# Patient Record
Sex: MALE | Race: WHITE | NOT HISPANIC OR LATINO | Employment: OTHER | URBAN - NONMETROPOLITAN AREA
[De-identification: names, ages, dates, MRNs, and addresses within clinical notes are randomized per-mention and may not be internally consistent; named-entity substitution may affect disease eponyms.]

---

## 2021-02-12 ENCOUNTER — HOSPITAL ENCOUNTER (OUTPATIENT)
Dept: LAB | Facility: MEDICAL CENTER | Age: 46
End: 2021-02-12
Attending: FAMILY MEDICINE
Payer: MEDICAID

## 2021-02-12 LAB
ALBUMIN SERPL BCP-MCNC: 4.4 G/DL (ref 3.2–4.9)
ALBUMIN/GLOB SERPL: 1.5 G/DL
ALP SERPL-CCNC: 57 U/L (ref 30–99)
ALT SERPL-CCNC: 13 U/L (ref 2–50)
ANION GAP SERPL CALC-SCNC: 9 MMOL/L (ref 7–16)
AST SERPL-CCNC: 16 U/L (ref 12–45)
BASOPHILS # BLD AUTO: 0.8 % (ref 0–1.8)
BASOPHILS # BLD: 0.05 K/UL (ref 0–0.12)
BILIRUB SERPL-MCNC: 0.5 MG/DL (ref 0.1–1.5)
BUN SERPL-MCNC: 14 MG/DL (ref 8–22)
CALCIUM SERPL-MCNC: 9.3 MG/DL (ref 8.5–10.5)
CHLORIDE SERPL-SCNC: 104 MMOL/L (ref 96–112)
CO2 SERPL-SCNC: 27 MMOL/L (ref 20–33)
CREAT SERPL-MCNC: 0.79 MG/DL (ref 0.5–1.4)
EOSINOPHIL # BLD AUTO: 0.22 K/UL (ref 0–0.51)
EOSINOPHIL NFR BLD: 3.7 % (ref 0–6.9)
ERYTHROCYTE [DISTWIDTH] IN BLOOD BY AUTOMATED COUNT: 42.7 FL (ref 35.9–50)
ERYTHROCYTE [SEDIMENTATION RATE] IN BLOOD BY WESTERGREN METHOD: 2 MM/HOUR (ref 0–15)
FASTING STATUS PATIENT QL REPORTED: NORMAL
GLOBULIN SER CALC-MCNC: 3 G/DL (ref 1.9–3.5)
GLUCOSE SERPL-MCNC: 97 MG/DL (ref 65–99)
HCT VFR BLD AUTO: 45.4 % (ref 42–52)
HGB BLD-MCNC: 15.2 G/DL (ref 14–18)
IMM GRANULOCYTES # BLD AUTO: 0.02 K/UL (ref 0–0.11)
IMM GRANULOCYTES NFR BLD AUTO: 0.3 % (ref 0–0.9)
LYMPHOCYTES # BLD AUTO: 2.65 K/UL (ref 1–4.8)
LYMPHOCYTES NFR BLD: 44.8 % (ref 22–41)
MCH RBC QN AUTO: 32.4 PG (ref 27–33)
MCHC RBC AUTO-ENTMCNC: 33.5 G/DL (ref 33.7–35.3)
MCV RBC AUTO: 96.8 FL (ref 81.4–97.8)
MONOCYTES # BLD AUTO: 0.42 K/UL (ref 0–0.85)
MONOCYTES NFR BLD AUTO: 7.1 % (ref 0–13.4)
NEUTROPHILS # BLD AUTO: 2.55 K/UL (ref 1.82–7.42)
NEUTROPHILS NFR BLD: 43.3 % (ref 44–72)
NRBC # BLD AUTO: 0 K/UL
NRBC BLD-RTO: 0 /100 WBC
PLATELET # BLD AUTO: 233 K/UL (ref 164–446)
PMV BLD AUTO: 9.7 FL (ref 9–12.9)
POTASSIUM SERPL-SCNC: 4.6 MMOL/L (ref 3.6–5.5)
PROT SERPL-MCNC: 7.4 G/DL (ref 6–8.2)
RBC # BLD AUTO: 4.69 M/UL (ref 4.7–6.1)
SODIUM SERPL-SCNC: 140 MMOL/L (ref 135–145)
TSH SERPL DL<=0.005 MIU/L-ACNC: 1.39 UIU/ML (ref 0.38–5.33)
WBC # BLD AUTO: 5.9 K/UL (ref 4.8–10.8)

## 2021-02-12 PROCEDURE — 85652 RBC SED RATE AUTOMATED: CPT

## 2021-02-12 PROCEDURE — 80053 COMPREHEN METABOLIC PANEL: CPT

## 2021-02-12 PROCEDURE — 85025 COMPLETE CBC W/AUTO DIFF WBC: CPT

## 2021-02-12 PROCEDURE — 36415 COLL VENOUS BLD VENIPUNCTURE: CPT

## 2021-02-12 PROCEDURE — 84443 ASSAY THYROID STIM HORMONE: CPT

## 2021-02-12 PROCEDURE — 86038 ANTINUCLEAR ANTIBODIES: CPT

## 2021-02-15 LAB — NUCLEAR IGG SER QL IA: NORMAL

## 2021-05-25 ENCOUNTER — TELEPHONE (OUTPATIENT)
Dept: SCHEDULING | Facility: IMAGING CENTER | Age: 46
End: 2021-05-25

## 2021-05-27 ENCOUNTER — OFFICE VISIT (OUTPATIENT)
Dept: MEDICAL GROUP | Facility: CLINIC | Age: 46
End: 2021-05-27
Payer: MEDICAID

## 2021-05-27 VITALS
RESPIRATION RATE: 16 BRPM | OXYGEN SATURATION: 97 % | WEIGHT: 182.8 LBS | TEMPERATURE: 98.5 F | DIASTOLIC BLOOD PRESSURE: 78 MMHG | HEART RATE: 78 BPM | HEIGHT: 70 IN | BODY MASS INDEX: 26.17 KG/M2 | SYSTOLIC BLOOD PRESSURE: 124 MMHG

## 2021-05-27 DIAGNOSIS — R53.82 CHRONIC FATIGUE: ICD-10-CM

## 2021-05-27 DIAGNOSIS — R09.81 NASAL CONGESTION: ICD-10-CM

## 2021-05-27 DIAGNOSIS — I25.2 PERSONAL HISTORY OF MI (MYOCARDIAL INFARCTION): ICD-10-CM

## 2021-05-27 DIAGNOSIS — J34.3 HYPERTROPHY OF NASAL TURBINATES: ICD-10-CM

## 2021-05-27 DIAGNOSIS — A98.5: ICD-10-CM

## 2021-05-27 PROBLEM — M54.31 RIGHT SIDED SCIATICA: Status: ACTIVE | Noted: 2019-04-16

## 2021-05-27 PROBLEM — Z20.2 HPV EXPOSURE: Status: ACTIVE | Noted: 2018-12-13

## 2021-05-27 PROCEDURE — 99204 OFFICE O/P NEW MOD 45 MIN: CPT | Performed by: PHYSICIAN ASSISTANT

## 2021-05-27 RX ORDER — ACYCLOVIR 200 MG/1
300 CAPSULE ORAL PRN
COMMUNITY
End: 2022-11-04

## 2021-05-27 RX ORDER — FLUTICASONE PROPIONATE 50 MCG
1 SPRAY, SUSPENSION (ML) NASAL DAILY
COMMUNITY
End: 2024-03-15

## 2021-05-27 RX ORDER — SODIUM CHLORIDE 9 MG/ML
INJECTION, SOLUTION INTRAVENOUS
COMMUNITY
Start: 2021-05-12 | End: 2021-05-27

## 2021-05-27 RX ORDER — OXYMETAZOLINE HYDROCHLORIDE 0.05 G/100ML
2 SPRAY NASAL
COMMUNITY
End: 2021-05-27

## 2021-05-27 RX ORDER — CETIRIZINE HYDROCHLORIDE 10 MG/1
10 TABLET ORAL DAILY
COMMUNITY

## 2021-05-27 ASSESSMENT — PATIENT HEALTH QUESTIONNAIRE - PHQ9
CLINICAL INTERPRETATION OF PHQ2 SCORE: 2
5. POOR APPETITE OR OVEREATING: 1 - SEVERAL DAYS
SUM OF ALL RESPONSES TO PHQ QUESTIONS 1-9: 8

## 2021-05-27 ASSESSMENT — FIBROSIS 4 INDEX: FIB4 SCORE: 0.86

## 2021-05-27 NOTE — PROGRESS NOTES
"Chief Complaint   Patient presents with   • Establish Care     requesting referral for MRI       HPI:  Fermín is a 45 y.o. male new patient presenting with the following:    Chronic fatigue  Patient complains of severe fatigue that puts him \"in bed for days\". Patient is concerned for demylinating disease. He states he gets brain fog, severe fatigue, back pain and cant get out of bed for a couple days after an \"attack\". Spends a great deal of time researching symptoms and is convinced he has a demyelinating disorder but not MS. Wants an MRI \"immediately to see where the lesions are\"    He has no current neurological symptoms and neurologic exam is normal. 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 States he will have his medical records sent to the clinic.     With such a complex medical history we will have him back in two weeks to go over more of his history in greater detail. We will repeat neurological exam at that time to assess for changes. Imaging and blood work will be ordered and based on findings we will refer to neurology if needed.    Personal history of Myocardial Infarction due to drugs  States he was doing meth and cocaine together when he was 15 and was taken to the hospital for chest pain and was told the drugs gave him a heart attack. He will try to get those records sent to the clinic. We will do an EKG at next visit so we have a current record of any residual heart damage and can compare tracings.     Nasal congestion  Chronic problem that is managed by using afrin and 10 minutes late using Flonase. He states that he was told to manage the problem in this way by a previous ENT provider. He states that this works well for him and he only has to do this once or twice a month.     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 States that he was told he had the antibodies for the disease but no active " disease. He was told that his immune system must have cleared the disease. He will get us medical records from [place] to update out records.    Hypertrophy of nasal turbinates  Seen By Dr Trey Elizondo - ENT in [place].Treated with Afrin spray 5 minutes before Flonase use. No surgical intervention unless nasal steroid management fails.      Patient Active Problem List    Diagnosis Date Noted   • Chronic fatigue 05/27/2021   • Personal history of Myocardial Infarction due to drugs 05/27/2021   • Hantavirus infection 05/27/2021   • Right sided sciatica 04/16/2019   • HPV exposure 12/13/2018   • Nasal congestion 11/12/2018   • Hypertrophy of nasal turbinates 10/15/2018   • Sprain of anterior cruciate ligament of left knee 04/29/2015       Current Outpatient Medications   Medication Sig Dispense Refill   • oxymetazoline (AFRIN) 0.05 % Solution Administer 2 Sprays into affected nostril(S) 2 times a day. 5 minutes before nasal steroid. Do not use longer than 3 days     • cetirizine (ZYRTEC) 10 MG Tab Take 10 mg by mouth every day.     • fluticasone (FLONASE) 50 MCG/ACT nasal spray Administer 1 Spray into affected nostril(S) every day.     • acyclovir (ZOVIRAX) 200 MG Cap Take 300 mg by mouth as needed.       No current facility-administered medications for this visit.         Allergies as of 05/27/2021 - Reviewed 05/27/2021   Allergen Reaction Noted   • Cephalosporins Swelling 04/28/2015        Social History     Socioeconomic History   • Marital status:      Spouse name: Sylwia   • Number of children: 3   • Years of education: Not on file   • Highest education level: Some college, no degree   Occupational History   • Not on file   Tobacco Use   • Smoking status: Never Smoker   • Smokeless tobacco: Never Used   Vaping Use   • Vaping Use: Never used   Substance and Sexual Activity   • Alcohol use: Yes     Alcohol/week: 0.6 - 1.2 oz     Types: 1 - 2 Standard drinks or equivalent per week   • Drug use: Yes      Frequency: 0.2 times per week     Types: Marijuana, Oral     Comment: Medical marajuana currently. Meth and cocaine - clean since 1990   • Sexual activity: Yes     Partners: Female   Other Topics Concern   • Not on file   Social History Narrative   • Not on file     Social Determinants of Health     Financial Resource Strain:    • Difficulty of Paying Living Expenses:    Food Insecurity:    • Worried About Running Out of Food in the Last Year:    • Ran Out of Food in the Last Year:    Transportation Needs:    • Lack of Transportation (Medical):    • Lack of Transportation (Non-Medical):    Physical Activity:    • Days of Exercise per Week:    • Minutes of Exercise per Session:    Stress:    • Feeling of Stress :    Social Connections:    • Frequency of Communication with Friends and Family:    • Frequency of Social Gatherings with Friends and Family:    • Attends Buddhism Services:    • Active Member of Clubs or Organizations:    • Attends Club or Organization Meetings:    • Marital Status:    Intimate Partner Violence:    • Fear of Current or Ex-Partner:    • Emotionally Abused:    • Physically Abused:    • Sexually Abused:        Family History   Problem Relation Age of Onset   • Cancer Mother         breast   • Psychiatric Illness Mother         schizophrenia   • Diabetes Father    • Heart Disease Father    • Hypertension Father    • Hyperlipidemia Father    • Other Sister         ovarian cysts   • Drug abuse Brother         OD   • Alcohol abuse Paternal Grandmother    • Alcohol abuse Paternal Grandfather    • Hyperlipidemia Brother    • Allergies Brother    • No Known Problems Sister        Past Surgical History:   Procedure Laterality Date   • OTHER Bilateral 08/2020    vasectomy reversal   • VASECTOMY Bilateral 2013       Review of Systems:   Constitutional: Positive for fatigue. Negative for fever, chills, weight change, fatigue, loss of appetite.  HNT: Positive for chronic nasal congestion and large nasal  "turbinates. Negative for nosebleeds, odynophagia, sore throat or changes in taste.    Eyes: Negative for vision changes.   Ears: Negative for recent hearing changes, pain or discharge.  Neck: Negative for pain, swelling, lumps or goiter.  Respiratory: Negative for cough, sputum production, shortness of breath and wheezing.    Cardiovascular: Negative for chest pain, palpitations, orthopnea and leg swelling.   Gastrointestinal: Negative for constipation, diarrhea, heartburn, dysphagia, nausea, vomiting or abdominal pain.   Genitourinary: Negative for dysuria, urgency and frequency.   Musculoskeletal: Positive for low back pain with right sided sciatica. Negative for myalgias, joint pain.  Skin: Negative for skin, hair or nail changes, rash, itching.   Neurological: Positive for intermittent brain fog, generalized weakness and severe fatigue. Negative for dizziness, tingling, tremors, sensory change, gait/coordination changes, focal weakness and headaches.   Endo/Heme/Allergies: Does not bruise/bleed easily.   Psychiatric/Behavioral: Negative for depression, suicidal ideas and memory loss.  The patient is not nervous/anxious and does not have insomnia.        Physical Exam:  /78 (BP Location: Right arm, Patient Position: Sitting, BP Cuff Size: Adult)   Pulse 78   Temp 36.9 °C (98.5 °F) (Temporal)   Resp 16   Ht 1.778 m (5' 10\") Comment: per pt  Wt 82.9 kg (182 lb 12.8 oz)   SpO2 97%   BMI 26.23 kg/m²    General:  Well nourished, well developed male. With a Body mass index is 26.23 kg/m². No apparent distress.  Eyes: EOM intact, PERRL, conjunctiva non-injected, sclera non-icteric.  Ears: Cindy pinnae, external auditory canals, TM pearly gray with normal light reflex bilaterally.  Neck: Neck supple with no cervical lymphadenopathy, JVD, palpable thyroid nodules or carotid bruits.  Pulmonary: Clear to ausculation bilaterally. Normal effort. No rales, ronchi, or wheezing.  Cardiovascular: Regular rate and " rhythm without murmur, rub or gallop.   Extremities: Full range of motion. Warm and well perfused with no edema.  Skin: Intact with no obvious rashes or lesions.  Neuro: Cranial nerves I-XII grossly intact.  Psych: Alert and oriented x 3.  Appropriately dressed. Mood and affect appropriate.    Assessment/Plan:    1. Chronic fatigue     2. Nasal congestion     3. Personal history of MI (myocardial infarction)     4. Hantavirus infection     5. Hypertrophy of nasal turbinates         Reviewed risks and benefits of treatment plan. Patient verbally agrees to plan of care.     Return in about 2 weeks (around 6/10/2021) for medical record review continuation.      Please note that this dictation was created using voice recognition software. I have made every reasonable attempt to correct obvious errors, but I expect that there are errors of grammar and possibly content that I did not discover before finalizing the note.

## 2021-05-28 RX ORDER — OXYMETAZOLINE HYDROCHLORIDE 0.05 G/100ML
2 SPRAY NASAL 2 TIMES DAILY
COMMUNITY
End: 2024-03-15

## 2021-05-28 NOTE — ASSESSMENT & PLAN NOTE
States he was doing meth and cocaine together when he was 15 and was taken to the hospital for chest pain and was told the drugs gave him a heart attack. He will try to get those records sent to the clinic. We will do an EKG at next visit so we have a current record of any residual heart damage and can compare tracings.

## 2021-05-28 NOTE — ASSESSMENT & PLAN NOTE
Chronic problem that is managed by using afrin and 10 minutes late using Flonase. He states that he was told to manage the problem in this way by a previous ENT provider. He states that this works well for him and he only has to do this once or twice a month.

## 2021-05-28 NOTE — ASSESSMENT & PLAN NOTE
Seen By Dr Trey Elizondo - ENT in [place].Treated with Afrin spray 5 minutes before Flonase use. No surgical intervention unless nasal steroid management fails.

## 2021-05-28 NOTE — ASSESSMENT & PLAN NOTE
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 States that he was told he had the antibodies for the disease but no active disease. He was told that his immune system must have cleared the disease. He will get us medical records from [place] to update out records.

## 2021-05-28 NOTE — ASSESSMENT & PLAN NOTE
"Patient complains of severe fatigue that puts him \"in bed for days\". Patient is concerned for demylinating disease. He states he gets brain fog, severe fatigue, back pain and cant get out of bed for a couple days after an \"attack\". Spends a great deal of time researching symptoms and is convinced he has a demyelinating disorder but not MS. Wants an MRI \"immediately to see where the lesions are\"    He has no current neurological symptoms and neurologic exam is normal. 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 States he will have his medical records sent to the clinic.     With such a complex medical history we will have him back in two weeks to go over more of his history in greater detail. We will repeat neurological exam at that time to assess for changes. Imaging and blood work will be ordered and based on findings we will refer to neurology if needed.  "

## 2021-06-11 ENCOUNTER — OFFICE VISIT (OUTPATIENT)
Dept: MEDICAL GROUP | Facility: CLINIC | Age: 46
End: 2021-06-11
Payer: MEDICAID

## 2021-06-11 VITALS
TEMPERATURE: 97.8 F | DIASTOLIC BLOOD PRESSURE: 68 MMHG | HEART RATE: 68 BPM | OXYGEN SATURATION: 96 % | SYSTOLIC BLOOD PRESSURE: 118 MMHG | RESPIRATION RATE: 16 BRPM | WEIGHT: 184.4 LBS | HEIGHT: 70 IN | BODY MASS INDEX: 26.4 KG/M2

## 2021-06-11 DIAGNOSIS — M79.605 BILATERAL LEG PAIN: ICD-10-CM

## 2021-06-11 DIAGNOSIS — R53.82 CHRONIC FATIGUE: ICD-10-CM

## 2021-06-11 DIAGNOSIS — R41.0 INTERMITTENT CONFUSION: ICD-10-CM

## 2021-06-11 DIAGNOSIS — M79.604 BILATERAL LEG PAIN: ICD-10-CM

## 2021-06-11 PROCEDURE — 99214 OFFICE O/P EST MOD 30 MIN: CPT | Performed by: PHYSICIAN ASSISTANT

## 2021-06-11 ASSESSMENT — FIBROSIS 4 INDEX: FIB4 SCORE: 0.86

## 2021-06-11 NOTE — ASSESSMENT & PLAN NOTE
Has episodes of debilitating fatigue, diffuse leg pain, confusion and upper back pain that lasts 2-3 days that puts him in bed unable to function with another 2-3 days of recovery time.  Patient states during these episodes he has trouble concentrating, gets very confused and loses track of what is happening and becomes very weak.  He loses his appetite and will sleep for long periods of time.  He is concerned that he may have MS or some other demyelinating disease that is causing him to have these episodes.  He has requested further testing to rule out MS. an MRI of the brain with and without contrast has been ordered we will follow-up with results.

## 2021-06-11 NOTE — PROGRESS NOTES
Chief Complaint   Patient presents with   • Follow-Up     pt states reoccurent issues        HISTORY OF PRESENT ILLNESS: Patient is a 45 y.o. male established patient who presents today to discuss the following issues:    Bilateral leg pain  Has episodes of debilitating fatigue, diffuse leg pain, confusion and upper back pain that lasts 2-3 days that puts him in bed unable to function with another 2-3 days of recovery time.  Patient states that when these episodes occur he starts getting significant pain in bilateral legs.  He then gets very weak and has to have help going to the bathroom.  He states that the weakness will last 2 to 3 days after the episode has passed before he gets his strength back.  We are going to order additional testing and follow-up with results.    Chronic fatigue  Has episodes of debilitating fatigue, diffuse leg pain, confusion and upper back pain that lasts 2-3 days that puts him in bed unable to function with another 2-3 days of recovery time.  Patient states during these episodes he has trouble concentrating, gets very confused and loses track of what is happening and becomes very weak.  He loses his appetite and will sleep for long periods of time.  He is concerned that he may have MS or some other demyelinating disease that is causing him to have these episodes.  He has requested further testing to rule out MS. an MRI of the brain with and without contrast has been ordered we will follow-up with results.      Patient Active Problem List    Diagnosis Date Noted   • Bilateral leg pain 06/11/2021   • Chronic fatigue 05/27/2021   • Personal history of Myocardial Infarction due to drugs 05/27/2021   • Hantavirus infection 05/27/2021   • Right sided sciatica 04/16/2019   • HPV exposure 12/13/2018   • Nasal congestion 11/12/2018   • Hypertrophy of nasal turbinates 10/15/2018   • Sprain of anterior cruciate ligament of left knee 04/29/2015       Allergies:Cephalosporins    Current Outpatient  Medications   Medication Sig Dispense Refill   • oxymetazoline (AFRIN) 0.05 % Solution Administer 2 Sprays into affected nostril(S) 2 times a day. 5 minutes before nasal steroid. Do not use longer than 3 days     • cetirizine (ZYRTEC) 10 MG Tab Take 10 mg by mouth every day.     • fluticasone (FLONASE) 50 MCG/ACT nasal spray Administer 1 Spray into affected nostril(S) every day.     • acyclovir (ZOVIRAX) 200 MG Cap Take 300 mg by mouth as needed.       No current facility-administered medications for this visit.       Hospital Outpatient Visit on 02/12/2021   Component Date Value Ref Range Status   • WBC 02/12/2021 5.9  4.8 - 10.8 K/uL Final   • RBC 02/12/2021 4.69* 4.70 - 6.10 M/uL Final   • Hemoglobin 02/12/2021 15.2  14.0 - 18.0 g/dL Final   • Hematocrit 02/12/2021 45.4  42.0 - 52.0 % Final   • MCV 02/12/2021 96.8  81.4 - 97.8 fL Final   • MCH 02/12/2021 32.4  27.0 - 33.0 pg Final   • MCHC 02/12/2021 33.5* 33.7 - 35.3 g/dL Final   • RDW 02/12/2021 42.7  35.9 - 50.0 fL Final   • Platelet Count 02/12/2021 233  164 - 446 K/uL Final   • MPV 02/12/2021 9.7  9.0 - 12.9 fL Final   • Neutrophils-Polys 02/12/2021 43.30* 44.00 - 72.00 % Final   • Lymphocytes 02/12/2021 44.80* 22.00 - 41.00 % Final   • Monocytes 02/12/2021 7.10  0.00 - 13.40 % Final   • Eosinophils 02/12/2021 3.70  0.00 - 6.90 % Final   • Basophils 02/12/2021 0.80  0.00 - 1.80 % Final   • Immature Granulocytes 02/12/2021 0.30  0.00 - 0.90 % Final   • Nucleated RBC 02/12/2021 0.00  /100 WBC Final   • Neutrophils (Absolute) 02/12/2021 2.55  1.82 - 7.42 K/uL Final    Includes immature neutrophils, if present.   • Lymphs (Absolute) 02/12/2021 2.65  1.00 - 4.80 K/uL Final   • Monos (Absolute) 02/12/2021 0.42  0.00 - 0.85 K/uL Final   • Eos (Absolute) 02/12/2021 0.22  0.00 - 0.51 K/uL Final   • Baso (Absolute) 02/12/2021 0.05  0.00 - 0.12 K/uL Final   • Immature Granulocytes (abs) 02/12/2021 0.02  0.00 - 0.11 K/uL Final   • NRBC (Absolute) 02/12/2021 0.00  K/uL  Final   • Sodium 02/12/2021 140  135 - 145 mmol/L Final   • Potassium 02/12/2021 4.6  3.6 - 5.5 mmol/L Final   • Chloride 02/12/2021 104  96 - 112 mmol/L Final   • Co2 02/12/2021 27  20 - 33 mmol/L Final   • Anion Gap 02/12/2021 9.0  7.0 - 16.0 Final   • Glucose 02/12/2021 97  65 - 99 mg/dL Final   • Bun 02/12/2021 14  8 - 22 mg/dL Final   • Creatinine 02/12/2021 0.79  0.50 - 1.40 mg/dL Final   • Calcium 02/12/2021 9.3  8.5 - 10.5 mg/dL Final   • AST(SGOT) 02/12/2021 16  12 - 45 U/L Final   • ALT(SGPT) 02/12/2021 13  2 - 50 U/L Final   • Alkaline Phosphatase 02/12/2021 57  30 - 99 U/L Final   • Total Bilirubin 02/12/2021 0.5  0.1 - 1.5 mg/dL Final   • Albumin 02/12/2021 4.4  3.2 - 4.9 g/dL Final   • Total Protein 02/12/2021 7.4  6.0 - 8.2 g/dL Final   • Globulin 02/12/2021 3.0  1.9 - 3.5 g/dL Final   • A-G Ratio 02/12/2021 1.5  g/dL Final   • Sed Rate Westergren 02/12/2021 2  0 - 15 mm/hour Final   • TSH 02/12/2021 1.390  0.380 - 5.330 uIU/mL Final    Comment: Please note new reference ranges effective 12/14/2017 10:00 AM  Pregnant Females, 1st Trimester  0.050-3.700  Pregnant Females, 2nd Trimester  0.310-4.350  Pregnant Females, 3rd Trimester  0.410-5.180     • Antinuclear Antibody 02/12/2021 None Detected  None Detected Final    Comment: If suspicion of connective tissue disease is strong and ANA PAULA EIA is  negative, consider testing for ANA PAULA by IFA ([id number]).  No antibodies to Anti-Nuclear Antibodies (ANA PAULA) detected.  The  Extractable Nuclear Antigen Antibodies (RNP, Riddle, SSA 52, SSA  60, Scleroderma, Leanna-1 and SSB) and Double Stranded DNA (dsDNA)  Antibody, IgG will not be performed.  INTERPRETIVE INFORMATION: Anti-Nuclear Antibodies (ANA PAULA), IgG by  SOFIA  Antinuclear Antibodies (ANA PAULA), IgG by SOFIA: ANA PAULA specimens are  screened using enzyme-linked immunosorbent assay (SOFIA)  methodology. All SOFIA results reported as Detected are further  tested by indirect fluorescent assay (IFA) using HEp-2 substrate  with an  IgG-specific conjugate. The ANA PAULA SOFIA screen is designed  to detect antibodies against dsDNA, histones, SS-A (Ro), SS-B  (La), Riddle, Riddle/RNP, Scl-70, Leanna-1, centromeric proteins, other  antigens extracted from the HEp-2 cell nucleus. ANA PAULA SOFIA assays  have been reported to have lower sensitivities than ANA PAULA IFA fo                           r  systemic autoimmune rheumatic diseases (SARD).  Negative results do not necessarily rule out SARD.  Performed By: [facility]  [address] [place] [id number]  : Ayla Lambert MD     • Fasting Status 2021 Fasting   Final   • GFR If  2021 >60  >60 mL/min/1.73 m 2 Final   • GFR If Non  2021 >60  >60 mL/min/1.73 m 2 Final   ]    The ASCVD Risk score (Reji DORSEY Jr., et al., 2013) failed to calculate for the following reasons:    The patient has a prior MI or stroke diagnosis    Social History     Tobacco Use   • Smoking status: Never Smoker   • Smokeless tobacco: Never Used   Vaping Use   • Vaping Use: Never used   Substance Use Topics   • Alcohol use: Yes     Alcohol/week: 0.6 - 1.2 oz     Types: 1 - 2 Standard drinks or equivalent per week   • Drug use: Yes     Frequency: 0.2 times per week     Types: Marijuana, Oral     Comment: Medical marclaire currently. Meth and cocaine - clean since        Family Status   Relation Name Status   • Mo     • Fa  Alive   • Sis  Alive   • Bro     • MGMo unknown Other   • MGFa unknown Other   • PGMo     • PGFa     • Bro  Alive   • Sis  Alive     Family History   Problem Relation Age of Onset   • Cancer Mother         breast   • Psychiatric Illness Mother         schizophrenia   • Diabetes Father    • Heart Disease Father    • Hypertension Father    • Hyperlipidemia Father    • Other Sister         ovarian cysts   • Drug abuse Brother         OD   • Alcohol abuse Paternal Grandmother    • Alcohol abuse Paternal Grandfather   "  • Hyperlipidemia Brother    • Allergies Brother    • No Known Problems Sister        Health Maintenance Summary                COVID-19 Vaccine Overdue 12/14/1987     IMM INFLUENZA Next Due 9/1/2021     IMM DTaP/Tdap/Td Vaccine Next Due 3/20/2028      Done 3/20/2018 Imm Admin: Tdap Vaccine           Review of Systems:   Constitutional: Positive for intermittent fatigue, loss of appetite.  Negative for fever, chills, weight change.  HNT: Negative for nosebleeds, congestion, odynophagia, sore throat or changes in taste.    Eyes: Negative for vision changes.   Ears: Negative for recent changes in hearing, pain or discharge.  Neck: Negative for pain, swelling, lumps or goiter.  Respiratory: Negative for cough, sputum production, shortness of breath and wheezing.    Cardiovascular: Negative for chest pain, palpitations, orthopnea and leg swelling.   Gastrointestinal: Negative for constipation, diarrhea, heartburn, dysphagia, nausea, vomiting or abdominal pain.   Genitourinary: Negative for dysuria, urgency and frequency.   Musculoskeletal: Positive for intermittent myalgia and joint pain.  Negative for back pain.  Skin: Negative for skin, hair or nail changes, rash, itching.   Neurological: Positive for intermittent confusion, dizziness, focal weakness and headache.  Negative for tingling, tremors, sensory change, gait/coordination changes.   Endo/Heme/Allergies: Does not bruise/bleed easily.   Psychiatric/Behavioral: Negative for depression, suicidal ideas and memory loss.  The patient is not nervous/anxious and does not have insomnia.        Exam:  /68   Pulse 68   Temp 36.6 °C (97.8 °F) (Temporal)   Resp 16   Ht 1.778 m (5' 10\")   Wt 83.6 kg (184 lb 6.4 oz)   SpO2 96%  Body mass index is 26.46 kg/m².  General:  Well nourished, well developed male. Body mass index is 26.46 kg/m². No apparent distress.  Eyes: EOM intact, PERRL, conjunctiva non-injected, sclera non-icteric.  Neck: Supple with no cervical " lymphadenopathy, JVD, palpable thyroid nodules or carotid bruits.  Pulmonary: Clear to ausculation bilaterally. Normal effort. No rales, ronchi, or wheezing.  Cardiovascular: Regular rate and rhythm without murmur, rub or gallop.   Extremities: Full range of motion. Warm and well perfused with no edema.  Skin: Intact with no obvious rashes or lesions.  Neuro: Cranial nerves I-XII intact.  Psych: Alert and oriented x 3.  Appropriately dressed. Mood and affect appropriate.      Assessment/Plan:  1. Bilateral leg pain     2. Chronic fatigue  MR-BRAIN-WITH & W/O   3. Intermittent confusion  MR-BRAIN-WITH & W/O       Reviewed risks and benefits of treatment plan. Patient verbally agrees to plan of care.     Return in about 1 month (around 7/11/2021) for f/u mri.    Please note that this dictation was created using voice recognition software. I have made every reasonable attempt to correct obvious errors, but I expect that there are errors of grammar and possibly content that I did not discover before finalizing the note.

## 2021-06-11 NOTE — ASSESSMENT & PLAN NOTE
Has episodes of debilitating fatigue, diffuse leg pain, confusion and upper back pain that lasts 2-3 days that puts him in bed unable to function with another 2-3 days of recovery time.  Patient states that when these episodes occur he starts getting significant pain in bilateral legs.  He then gets very weak and has to have help going to the bathroom.  He states that the weakness will last 2 to 3 days after the episode has passed before he gets his strength back.  We are going to order additional testing and follow-up with results.

## 2021-06-11 NOTE — LETTER
[facility]  Renata Collazo P.A.-C.  [address]  [place] [address]  Fax: [phone]   Authorization for Release/Disclosure of   Protected Health Information   Name: GALLO PENA : 1975 SSN: [id number]   Address: [address] [place] [address]  [place] [address] Phone:    [phone] (home)    I authorize the entity listed below to release/disclose the PHI below to:   [facility]/Renata Collazo P.A.-C. and Renata Collazo P.A.-C.   Provider or Entity Name:   Dr. Magda Sharif MD     Address   [facility], [facility] [[email or web address]]  [address]  [address] Phone:  [phone]    Fax:     Reason for request: continuity of care   Information to be released:    [  ] LAST COLONOSCOPY,  including any PATH REPORT and follow-up  [  ] LAST FIT/COLOGUARD RESULT [  ] LAST DEXA  [  ] LAST MAMMOGRAM  [  ] LAST PAP  [  ] LAST LABS [  ] RETINA EXAM REPORT  [  ] IMMUNIZATION RECORDS  [XXX] Release all info      [XXX] Check here and initial the line next to each item to release ALL health information INCLUDING  _____ Care and treatment for drug and / or alcohol abuse  _____ HIV testing, infection status, or AIDS  _____ Genetic Testing    DATES OF SERVICE OR TIME PERIOD TO BE DISCLOSED: _ to Present____  I understand and acknowledge that:  * This Authorization may be revoked at any time by you in writing, except if your health information has already been used or disclosed.  * Your health information that will be used or disclosed as a result of you signing this authorization could be re-disclosed by the recipient. If this occurs, your re-disclosed health information may no longer be protected by State or Federal laws.  * You may refuse to sign this Authorization. Your refusal will not affect your ability to obtain treatment.  * This Authorization becomes effective upon signing and will  on (date) __________.      If no date is indicated, this Authorization will   one (1) year from the signature date.    Name: Fermín Adria Ortiz    Signature:   Date:     2021       PLEASE FAX REQUESTED RECORDS BACK TO: [phone]

## 2021-07-13 ENCOUNTER — HOSPITAL ENCOUNTER (OUTPATIENT)
Dept: RADIOLOGY | Facility: MEDICAL CENTER | Age: 46
End: 2021-07-13
Attending: PHYSICIAN ASSISTANT
Payer: MEDICAID

## 2021-07-13 DIAGNOSIS — R53.82 CHRONIC FATIGUE: ICD-10-CM

## 2021-07-13 DIAGNOSIS — R41.0 INTERMITTENT CONFUSION: ICD-10-CM

## 2021-07-13 PROCEDURE — A9576 INJ PROHANCE MULTIPACK: HCPCS | Performed by: PHYSICIAN ASSISTANT

## 2021-07-13 PROCEDURE — 700117 HCHG RX CONTRAST REV CODE 255: Performed by: PHYSICIAN ASSISTANT

## 2021-07-13 PROCEDURE — 70553 MRI BRAIN STEM W/O & W/DYE: CPT

## 2021-07-13 RX ADMIN — GADOTERIDOL 15 ML: 279.3 INJECTION, SOLUTION INTRAVENOUS at 14:40

## 2021-08-02 ENCOUNTER — TELEPHONE (OUTPATIENT)
Dept: MEDICAL GROUP | Facility: CLINIC | Age: 46
End: 2021-08-02

## 2021-08-02 DIAGNOSIS — M79.604 BILATERAL LEG PAIN: ICD-10-CM

## 2021-08-02 DIAGNOSIS — M54.41 CHRONIC MIDLINE LOW BACK PAIN WITH RIGHT-SIDED SCIATICA: ICD-10-CM

## 2021-08-02 DIAGNOSIS — G89.29 CHRONIC MIDLINE LOW BACK PAIN WITH RIGHT-SIDED SCIATICA: ICD-10-CM

## 2021-08-02 DIAGNOSIS — G89.29 CHRONIC MID BACK PAIN: ICD-10-CM

## 2021-08-02 DIAGNOSIS — M54.9 CHRONIC MID BACK PAIN: ICD-10-CM

## 2021-08-02 DIAGNOSIS — M79.605 BILATERAL LEG PAIN: ICD-10-CM

## 2021-08-02 DIAGNOSIS — M54.31 RIGHT SIDED SCIATICA: ICD-10-CM

## 2021-08-02 NOTE — TELEPHONE ENCOUNTER
----- Message from Fermín Ortiz sent at 8/2/2021  7:12 AM PDT -----  Regarding: RE: Hello,  Contact: [phone]  I am following up on a referral to a pain specialist. My back has been locked up for over two weeks. The pain has made it impossible to function in daily required tasks. Please let me know how to go about getting this referral.  Thank you,  Fermín Ortiz    ----- Message -----  From: Physician Assistant Renata Collazo  Sent: 7/20/21 6:52 PM  To: Fermín Ortiz  Subject: Hello,    Thank you for your message! I am currently out of the office so there may be a delayed response. Your message has been passed on to another provider to assist in my absence.    Thank you,  Renata Collazo P.A.-C.      ----- Message -----       From:Fermín Ortiz       Sent:7/20/2021  6:52 PM PDT         To:Physician Assistant eRnata Collazo    Subject:Non-Urgent Medical Question    Good Evening,  As you know I suffer from chronic pain. I had intended to request a referral to a pain specialist during our followup. However it was canceled. Would you please submit a referral to a pain specialist?  Thank you,

## 2021-08-02 NOTE — PROGRESS NOTES
"Patient demanding to have a referral to pain management. States that his back has been \"locked up for two weeks\". Has had no imaging done on his back. Will order x-rays of thoracic and lumbar spine. Referral has been placed to pain management per patient insistence. Will follow up with results of x-rays.  "

## 2021-08-03 ENCOUNTER — APPOINTMENT (OUTPATIENT)
Dept: RADIOLOGY | Facility: IMAGING CENTER | Age: 46
End: 2021-08-03
Attending: PHYSICIAN ASSISTANT
Payer: MEDICAID

## 2021-08-03 ENCOUNTER — APPOINTMENT (OUTPATIENT)
Dept: URGENT CARE | Facility: PHYSICIAN GROUP | Age: 46
End: 2021-08-03
Payer: MEDICAID

## 2021-08-03 DIAGNOSIS — M79.605 BILATERAL LEG PAIN: ICD-10-CM

## 2021-08-03 DIAGNOSIS — M54.31 RIGHT SIDED SCIATICA: ICD-10-CM

## 2021-08-03 DIAGNOSIS — G89.29 CHRONIC MIDLINE LOW BACK PAIN WITH RIGHT-SIDED SCIATICA: ICD-10-CM

## 2021-08-03 DIAGNOSIS — M79.604 BILATERAL LEG PAIN: ICD-10-CM

## 2021-08-03 DIAGNOSIS — M54.41 CHRONIC MIDLINE LOW BACK PAIN WITH RIGHT-SIDED SCIATICA: ICD-10-CM

## 2021-08-03 DIAGNOSIS — M54.9 CHRONIC MID BACK PAIN: ICD-10-CM

## 2021-08-03 DIAGNOSIS — G89.29 CHRONIC MID BACK PAIN: ICD-10-CM

## 2021-08-03 PROCEDURE — 72100 X-RAY EXAM L-S SPINE 2/3 VWS: CPT | Mod: TC,FY | Performed by: PHYSICIAN ASSISTANT

## 2021-08-03 PROCEDURE — 72070 X-RAY EXAM THORAC SPINE 2VWS: CPT | Mod: TC,FY | Performed by: PHYSICIAN ASSISTANT

## 2021-08-03 NOTE — TELEPHONE ENCOUNTER
Renata Collazo P.A.-C.  You 18 hours ago (2:18 PM)     Patient has never even had any imaging done of his back. Ordered x-rays of thoracic and lumbar spine and placed order to pain management. They may or may not see him before complete imaging is done.     Please let patient know he needs to go get his x-rays completes and we will contact him with the results.     Thanks.    Message text        shea sent

## 2021-08-31 ENCOUNTER — OFFICE VISIT (OUTPATIENT)
Dept: MEDICAL GROUP | Facility: CLINIC | Age: 46
End: 2021-08-31
Payer: MEDICAID

## 2021-08-31 VITALS
HEIGHT: 70 IN | WEIGHT: 187 LBS | OXYGEN SATURATION: 96 % | BODY MASS INDEX: 26.77 KG/M2 | DIASTOLIC BLOOD PRESSURE: 78 MMHG | SYSTOLIC BLOOD PRESSURE: 112 MMHG | HEART RATE: 73 BPM | RESPIRATION RATE: 16 BRPM | TEMPERATURE: 97.4 F

## 2021-08-31 DIAGNOSIS — R53.82 CHRONIC FATIGUE: ICD-10-CM

## 2021-08-31 DIAGNOSIS — M51.34 DDD (DEGENERATIVE DISC DISEASE), THORACIC: ICD-10-CM

## 2021-08-31 PROCEDURE — 99214 OFFICE O/P EST MOD 30 MIN: CPT | Performed by: PHYSICIAN ASSISTANT

## 2021-08-31 RX ORDER — NAPROXEN 500 MG/1
500 TABLET ORAL 2 TIMES DAILY WITH MEALS
Qty: 60 TABLET | Refills: 1 | Status: SHIPPED | OUTPATIENT
Start: 2021-08-31 | End: 2022-10-06

## 2021-08-31 ASSESSMENT — FIBROSIS 4 INDEX: FIB4 SCORE: 0.86

## 2021-09-08 ASSESSMENT — ENCOUNTER SYMPTOMS
PSYCHIATRIC NEGATIVE: 1
CONSTITUTIONAL NEGATIVE: 1
GASTROINTESTINAL NEGATIVE: 1
CARDIOVASCULAR NEGATIVE: 1
NEUROLOGICAL NEGATIVE: 1
EYES NEGATIVE: 1
RESPIRATORY NEGATIVE: 1
BACK PAIN: 1

## 2021-09-08 ASSESSMENT — VISUAL ACUITY: OU: 1

## 2021-09-08 NOTE — PROGRESS NOTES
Subjective   Fermín Ortiz is a 45 y.o. male who presents with Arthritis and Overdue Lab And Imaging Result Follow-up    1. Chronic fatigue  Still having bouts of fatigue. All labs are within normal limits. Wants a MRI to look for lesions on his spinal cord as he believes that he has a demyelinating disease that is causing the fatigue.    2. DDD (degenerative disc disease), thoracic  Back x-rays are normal. Convinced that these debilitating episodes that he has are related to a demyelinating disease. MRI ordered to look at possible causes of this pain that he has. We will look for pinched nerves, foraminal narrowing and demyelination as requested. Will follow up after MRI.  - MR-THORACIC SPINE-W/O; Future  - naproxen (NAPROSYN) 500 MG Tab; Take 1 Tablet by mouth 2 times a day with meals.  Dispense: 60 Tablet; Refill: 1    Past Medical History:  No date: Allergy      Comment:  Seasonal, cephalosporins  No date: Asthma      Comment:  Seasonal asthma  1990: Heart attack (HCC)      Comment:  drug induced MI (meth and cocaine)  No date: Hyperlipidemia  No date: IBD (inflammatory bowel disease)      Comment:  constipation  1990: Substance abuse (HCC)      Comment:  Meth and cocaine - clean since 1990  Past Surgical History:  08/2020: OTHER; Bilateral      Comment:  vasectomy reversal  2013: VASECTOMY; Bilateral  Social History    Tobacco Use      Smoking status: Never Smoker      Smokeless tobacco: Never Used    Vaping Use      Vaping Use: Never used    Alcohol use: Yes      Alcohol/week: 0.6 - 1.2 oz      Types: 1 - 2 Standard drinks or equivalent per week    Drug use: Yes      Frequency: 0.2 times per week      Types: Marijuana, Oral      Comment: Medical jamel currently. Meth and cocaine - clean since 1990    Review of patient's family history indicates:  Problem: Cancer      Relation: Mother          Age of Onset: (Not Specified)          Comment: breast  Problem: Psychiatric Illness      Relation: Mother           Age of Onset: (Not Specified)          Comment: schizophrenia  Problem: Diabetes      Relation: Father          Age of Onset: (Not Specified)  Problem: Heart Disease      Relation: Father          Age of Onset: (Not Specified)  Problem: Hypertension      Relation: Father          Age of Onset: (Not Specified)  Problem: Hyperlipidemia      Relation: Father          Age of Onset: (Not Specified)  Problem: Other      Relation: Sister          Age of Onset: (Not Specified)          Comment: ovarian cysts  Problem: Drug abuse      Relation: Brother          Age of Onset: (Not Specified)          Comment: OD  Problem: Alcohol abuse      Relation: Paternal Grandmother          Age of Onset: (Not Specified)  Problem: Alcohol abuse      Relation: Paternal Grandfather          Age of Onset: (Not Specified)  Problem: Hyperlipidemia      Relation: Brother          Age of Onset: (Not Specified)  Problem: Allergies      Relation: Brother          Age of Onset: (Not Specified)  Problem: No Known Problems      Relation: Sister          Age of Onset: (Not Specified)      Current Outpatient Medications: •  naproxen (NAPROSYN) 500 MG Tab, Take 1 Tablet by mouth 2 times a day with meals., Disp: 60 Tablet, Rfl: 1•  oxymetazoline (AFRIN) 0.05 % Solution, Administer 2 Sprays into affected nostril(S) 2 times a day. 5 minutes before nasal steroid. Do not use longer than 3 days, Disp: , Rfl: •  cetirizine (ZYRTEC) 10 MG Tab, Take 10 mg by mouth every day., Disp: , Rfl: •  fluticasone (FLONASE) 50 MCG/ACT nasal spray, Administer 1 Spray into affected nostril(S) every day., Disp: , Rfl: •  acyclovir (ZOVIRAX) 200 MG Cap, Take 300 mg by mouth as needed., Disp: , Rfl:      Patient was instructed on the use of medications, either prescriptions or OTC and informed on when the appropriate follow up time period should be. In addition, patient was also instructed that should any acute worsening occur that they should notify this clinic asap or  "call 911.      Review of Systems   Constitutional: Negative.    HENT: Negative.    Eyes: Negative.    Respiratory: Negative.    Cardiovascular: Negative.    Gastrointestinal: Negative.    Genitourinary: Negative.    Musculoskeletal: Positive for back pain.   Skin: Negative.    Neurological: Negative.    Endo/Heme/Allergies: Negative.    Psychiatric/Behavioral: Negative.      Objective     /78 (BP Location: Left arm, Patient Position: Sitting, BP Cuff Size: Adult)   Pulse 73   Temp 36.3 °C (97.4 °F) (Temporal)   Resp 16   Ht 1.778 m (5' 10\") Comment: obtianed from chart  Wt 84.8 kg (187 lb) Comment: with shoes on  SpO2 96%   BMI 26.83 kg/m²      Physical Exam  Vitals and nursing note reviewed.   Constitutional:       Appearance: Normal appearance. He is well-developed and well-groomed.   HENT:      Head: Normocephalic and atraumatic.      Nose: Nose normal.      Mouth/Throat:      Lips: Pink. No lesions.      Mouth: Mucous membranes are moist.   Eyes:      General: Lids are normal. Vision grossly intact. Gaze aligned appropriately.      Extraocular Movements: Extraocular movements intact.      Conjunctiva/sclera: Conjunctivae normal.      Pupils: Pupils are equal, round, and reactive to light.   Neck:      Thyroid: No thyromegaly.      Vascular: No carotid bruit or JVD.      Trachea: Trachea and phonation normal.   Cardiovascular:      Rate and Rhythm: Normal rate and regular rhythm.      Heart sounds: Normal heart sounds. No murmur heard.   No friction rub. No gallop.    Pulmonary:      Effort: Pulmonary effort is normal.      Breath sounds: Normal breath sounds. No wheezing, rhonchi or rales.   Musculoskeletal:         General: Normal range of motion.      Cervical back: Normal range of motion and neck supple.      Right lower leg: No edema.      Left lower leg: No edema.   Lymphadenopathy:      Cervical: No cervical adenopathy.   Skin:     General: Skin is warm and dry.      Capillary Refill: " Capillary refill takes less than 2 seconds.      Findings: No lesion or rash.   Neurological:      Mental Status: He is alert and oriented to person, place, and time.      Cranial Nerves: Cranial nerves are intact.   Psychiatric:         Attention and Perception: Attention and perception normal.         Mood and Affect: Mood and affect normal.         Speech: Speech normal.         Behavior: Behavior normal. Behavior is cooperative.         Thought Content: Thought content normal.         Judgment: Judgment normal.       Assessment & Plan      1. Chronic fatigue    2. DDD (degenerative disc disease), thoracic  - MR-THORACIC SPINE-W/O; Future  - naproxen (NAPROSYN) 500 MG Tab; Take 1 Tablet by mouth 2 times a day with meals.  Dispense: 60 Tablet; Refill: 1

## 2021-09-09 ENCOUNTER — HOSPITAL ENCOUNTER (OUTPATIENT)
Dept: RADIOLOGY | Facility: MEDICAL CENTER | Age: 46
End: 2021-09-09
Attending: PHYSICIAN ASSISTANT
Payer: MEDICAID

## 2021-09-09 DIAGNOSIS — M51.34 DDD (DEGENERATIVE DISC DISEASE), THORACIC: ICD-10-CM

## 2021-09-09 PROCEDURE — 72146 MRI CHEST SPINE W/O DYE: CPT

## 2021-09-29 ENCOUNTER — OFFICE VISIT (OUTPATIENT)
Dept: MEDICAL GROUP | Facility: CLINIC | Age: 46
End: 2021-09-29
Payer: MEDICAID

## 2021-09-29 VITALS
TEMPERATURE: 97.3 F | HEIGHT: 70 IN | RESPIRATION RATE: 16 BRPM | BODY MASS INDEX: 26.63 KG/M2 | HEART RATE: 72 BPM | SYSTOLIC BLOOD PRESSURE: 122 MMHG | WEIGHT: 186 LBS | DIASTOLIC BLOOD PRESSURE: 80 MMHG | OXYGEN SATURATION: 97 %

## 2021-09-29 DIAGNOSIS — M79.604 BILATERAL LEG PAIN: ICD-10-CM

## 2021-09-29 DIAGNOSIS — M79.605 BILATERAL LEG PAIN: ICD-10-CM

## 2021-09-29 DIAGNOSIS — R53.82 CHRONIC FATIGUE: ICD-10-CM

## 2021-09-29 DIAGNOSIS — I25.2 PERSONAL HISTORY OF MI (MYOCARDIAL INFARCTION): ICD-10-CM

## 2021-09-29 DIAGNOSIS — A98.5: ICD-10-CM

## 2021-09-29 PROCEDURE — 99214 OFFICE O/P EST MOD 30 MIN: CPT | Performed by: PHYSICIAN ASSISTANT

## 2021-09-29 RX ORDER — ROSUVASTATIN CALCIUM 10 MG/1
10 TABLET, COATED ORAL EVERY EVENING
Qty: 90 TABLET | Refills: 1 | Status: SHIPPED | OUTPATIENT
Start: 2021-09-29 | End: 2022-10-19

## 2021-09-29 RX ORDER — ASPIRIN 81 MG/1
81 TABLET ORAL DAILY
Qty: 90 TABLET | Refills: 3 | Status: SHIPPED | OUTPATIENT
Start: 2021-09-29 | End: 2022-11-01

## 2021-09-29 ASSESSMENT — ENCOUNTER SYMPTOMS
TINGLING: 0
LOSS OF CONSCIOUSNESS: 0
SEIZURES: 0
GASTROINTESTINAL NEGATIVE: 1
RESPIRATORY NEGATIVE: 1
PSYCHIATRIC NEGATIVE: 1
CARDIOVASCULAR NEGATIVE: 1
DIZZINESS: 0
BACK PAIN: 1
SPEECH CHANGE: 0
SENSORY CHANGE: 1
EYES NEGATIVE: 1
MYALGIAS: 1
TREMORS: 0

## 2021-09-29 ASSESSMENT — FIBROSIS 4 INDEX: FIB4 SCORE: 0.86

## 2021-09-29 ASSESSMENT — VISUAL ACUITY: OU: 1

## 2021-09-30 NOTE — PROGRESS NOTES
Subjective   Fermín Ortiz is a 45 y.o. male who presents with Chronic Care Management and Overdue Lab And Imaging Result Follow-up    1. Chronic fatigue   Episodes of debilitating fatigue, pain, intermittent confusion, minimal mental status changes lasting 2-3 days at a time.  All imaging has been essentially normal. Has some mild DDD but nothing to explain the random symptoms that he is reporting.  He has had scans done to rule out MS though he is fairly certain that he still has this. He researches his symptoms online frequently and is concerned that he may now have neural herpes. Is now requesting evaluation by a neurologist for a more thorough evaluation.  - REFERRAL TO NEUROLOGY    2. Hantavirus infection  States that he was diagnosed with hantavirus years ago.  When he was tested it showed that he had the antibodies but the infection was not active.  He was told that his body had cleared the infection.  He believes that the hantavirus has something to do with his symptoms at this time.  - REFERRAL TO NEUROLOGY    3. Bilateral leg pain  Patient believes that he has MS or a demyelinating disorder that causes his bilateral leg pain and chronic fatigue in addition to his intermittent confusion.  He wants to be evaluated by neurology because he feels we are not doing the right tests to find what is wrong with him.  - REFERRAL TO NEUROLOGY    4. Personal history of Myocardial Infarction due to drugs  Diagnosed at age 15.  Was told he had a minimal amount of heart damage.  He is not currently on any aspirin or statins to protect his heart muscle.  Both will be started today in low-dose  - aspirin 81 MG EC tablet; Take 1 Tablet by mouth every day.  Dispense: 90 Tablet; Refill: 3  - rosuvastatin (CRESTOR) 10 MG Tab; Take 1 Tablet by mouth every evening.  Dispense: 90 Tablet; Refill: 1    Past Medical History:  No date: Allergy      Comment:  Seasonal, cephalosporins  No date: Asthma      Comment:  Seasonal  asthma  1990: Heart attack (HCC)      Comment:  drug induced MI (meth and cocaine)  No date: Hyperlipidemia  No date: IBD (inflammatory bowel disease)      Comment:  constipation  1990: Substance abuse (HCC)      Comment:  Meth and cocaine - clean since 1990  Past Surgical History:  08/2020: OTHER; Bilateral      Comment:  vasectomy reversal  2013: VASECTOMY; Bilateral  Social History    Tobacco Use      Smoking status: Never Smoker      Smokeless tobacco: Never Used    Vaping Use      Vaping Use: Never used    Alcohol use: Yes      Alcohol/week: 0.6 - 1.2 oz      Types: 1 - 2 Standard drinks or equivalent per week    Drug use: Yes      Frequency: 0.2 times per week      Types: Marijuana, Oral      Comment: Medical marajuana currently. Meth and cocaine - clean since 1990    Review of patient's family history indicates:  Problem: Cancer      Relation: Mother          Age of Onset: (Not Specified)          Comment: breast  Problem: Psychiatric Illness      Relation: Mother          Age of Onset: (Not Specified)          Comment: schizophrenia  Problem: Diabetes      Relation: Father          Age of Onset: (Not Specified)  Problem: Heart Disease      Relation: Father          Age of Onset: (Not Specified)  Problem: Hypertension      Relation: Father          Age of Onset: (Not Specified)  Problem: Hyperlipidemia      Relation: Father          Age of Onset: (Not Specified)  Problem: Other      Relation: Sister          Age of Onset: (Not Specified)          Comment: ovarian cysts  Problem: Drug abuse      Relation: Brother          Age of Onset: (Not Specified)          Comment: OD  Problem: Alcohol abuse      Relation: Paternal Grandmother          Age of Onset: (Not Specified)  Problem: Alcohol abuse      Relation: Paternal Grandfather          Age of Onset: (Not Specified)  Problem: Hyperlipidemia      Relation: Brother          Age of Onset: (Not Specified)  Problem: Allergies      Relation: Brother          Age of  "Onset: (Not Specified)  Problem: No Known Problems      Relation: Sister          Age of Onset: (Not Specified)      Current Outpatient Medications: •  aspirin 81 MG EC tablet, Take 1 Tablet by mouth every day., Disp: 90 Tablet, Rfl: 3•  rosuvastatin (CRESTOR) 10 MG Tab, Take 1 Tablet by mouth every evening., Disp: 90 Tablet, Rfl: 1•  naproxen (NAPROSYN) 500 MG Tab, Take 1 Tablet by mouth 2 times a day with meals., Disp: 60 Tablet, Rfl: 1•  oxymetazoline (AFRIN) 0.05 % Solution, Administer 2 Sprays into affected nostril(S) 2 times a day. 5 minutes before nasal steroid. Do not use longer than 3 days, Disp: , Rfl: •  cetirizine (ZYRTEC) 10 MG Tab, Take 10 mg by mouth every day., Disp: , Rfl: •  fluticasone (FLONASE) 50 MCG/ACT nasal spray, Administer 1 Spray into affected nostril(S) every day., Disp: , Rfl: •  acyclovir (ZOVIRAX) 200 MG Cap, Take 300 mg by mouth as needed., Disp: , Rfl:      Patient was instructed on the use of medications, either prescriptions or OTC and informed on when the appropriate follow up time period should be. In addition, patient was also instructed that should any acute worsening occur that they should notify this clinic asap or call 911.      Review of Systems   Constitutional: Positive for malaise/fatigue.   HENT: Negative.    Eyes: Negative.    Respiratory: Negative.    Cardiovascular: Negative.    Gastrointestinal: Negative.    Genitourinary: Negative.    Musculoskeletal: Positive for back pain and myalgias.   Skin: Negative.    Neurological: Positive for sensory change. Negative for dizziness, tingling, tremors, speech change, seizures and loss of consciousness.   Endo/Heme/Allergies: Negative.    Psychiatric/Behavioral: Negative.      Objective     /80 (BP Location: Left arm, Patient Position: Sitting, BP Cuff Size: Large adult)   Pulse 72   Temp 36.3 °C (97.3 °F) (Temporal)   Resp 16   Ht 1.778 m (5' 10\") Comment: obtained from chart  Wt 84.4 kg (186 lb) Comment: with " shoes on  SpO2 97%   BMI 26.69 kg/m²      Physical Exam  Vitals and nursing note reviewed.   Constitutional:       Appearance: Normal appearance. He is well-developed and well-groomed.   HENT:      Head: Normocephalic and atraumatic.      Nose: Nose normal.      Mouth/Throat:      Lips: Pink. No lesions.      Mouth: Mucous membranes are moist.   Eyes:      General: Lids are normal. Vision grossly intact. Gaze aligned appropriately.      Extraocular Movements: Extraocular movements intact.      Conjunctiva/sclera: Conjunctivae normal.      Pupils: Pupils are equal, round, and reactive to light.   Neck:      Thyroid: No thyromegaly.      Vascular: No carotid bruit or JVD.      Trachea: Trachea and phonation normal.   Cardiovascular:      Rate and Rhythm: Normal rate and regular rhythm.      Heart sounds: Normal heart sounds. No murmur heard.   No friction rub. No gallop.    Pulmonary:      Effort: Pulmonary effort is normal.      Breath sounds: Normal breath sounds. No wheezing, rhonchi or rales.   Musculoskeletal:         General: Normal range of motion.      Cervical back: Normal range of motion and neck supple.      Right lower leg: No edema.      Left lower leg: No edema.   Lymphadenopathy:      Cervical: No cervical adenopathy.   Skin:     General: Skin is warm and dry.      Capillary Refill: Capillary refill takes less than 2 seconds.      Findings: No lesion or rash.   Neurological:      Mental Status: He is alert and oriented to person, place, and time.      Cranial Nerves: Cranial nerves are intact.   Psychiatric:         Attention and Perception: Attention and perception normal.         Mood and Affect: Mood and affect normal.         Speech: Speech normal.         Behavior: Behavior normal. Behavior is cooperative.         Thought Content: Thought content normal.         Judgment: Judgment normal.       Assessment & Plan      1. Chronic fatigue  - REFERRAL TO NEUROLOGY    2. Hantavirus infection  -  REFERRAL TO NEUROLOGY    3. Bilateral leg pain  - REFERRAL TO NEUROLOGY    4. Personal history of Myocardial Infarction due to drugs  - aspirin 81 MG EC tablet; Take 1 Tablet by mouth every day.  Dispense: 90 Tablet; Refill: 3  - rosuvastatin (CRESTOR) 10 MG Tab; Take 1 Tablet by mouth every evening.  Dispense: 90 Tablet; Refill: 1

## 2021-10-08 ENCOUNTER — OFFICE VISIT (OUTPATIENT)
Dept: NEUROLOGY | Facility: MEDICAL CENTER | Age: 46
End: 2021-10-08
Attending: PSYCHIATRY & NEUROLOGY
Payer: MEDICAID

## 2021-10-08 VITALS
HEIGHT: 70 IN | BODY MASS INDEX: 27.17 KG/M2 | OXYGEN SATURATION: 97 % | RESPIRATION RATE: 20 BRPM | SYSTOLIC BLOOD PRESSURE: 120 MMHG | HEART RATE: 73 BPM | TEMPERATURE: 98.3 F | WEIGHT: 189.82 LBS | DIASTOLIC BLOOD PRESSURE: 78 MMHG

## 2021-10-08 DIAGNOSIS — R53.82 CHRONIC FATIGUE: ICD-10-CM

## 2021-10-08 PROCEDURE — 99204 OFFICE O/P NEW MOD 45 MIN: CPT | Performed by: PSYCHIATRY & NEUROLOGY

## 2021-10-08 PROCEDURE — 99211 OFF/OP EST MAY X REQ PHY/QHP: CPT | Performed by: PSYCHIATRY & NEUROLOGY

## 2021-10-08 ASSESSMENT — FIBROSIS 4 INDEX: FIB4 SCORE: 0.86

## 2021-10-08 NOTE — PROGRESS NOTES
"[facility] NEUROLOGY  GENERAL NEUROLOGY  NEW PATIENT VISIT    Referral source: Renata Collazo PA-C    CC: chronic fatigue, bilateral leg pain    HISTORY OF ILLNESS:  Fermín Ortiz is a 45 y.o. man with a history most notable for HSV2, hand foot and mouth disease, Hantavirus infection, chronic fatigue, right-sided sciatica, and MI.  Today, he was unaccompanied, and he provided the following history:    1999:  Fermín tore a muscle in his left upper extremity and left upper back.  He had a \"trapped nerve,\" and he received physical therapy.    2016:  Within about a 9-month period Fermín had 1) hand-foot-and mouth disease, 2) heavy mold exposure, 3) DTAP immunization, and 4) Hantavirus.    Since that time, Fermín has struggled with chronic symptoms.  He began getting \"sick\" on a monthly basis.    2017:  During the first year Fermín attributed his monthly illnesses to bad luck.    2018:  After about 1.5 years, Fermín went to his doctor reporting recurrent illnesses.  He has been \"chasing this\" ever since.    Each episode progresses as follows:    Fermín develops pain in his thorax and thighs.  This is accompanied by fatigue and severe thirst with dry mouth.  Later, he stops feeling hungry.  He develops flatulence and constipation.  He develops \"physical and mental\" fatigue to the extent than he is \"bedridden\" for 24-48 hours.  Eventually, his back pain, thigh pain, and fatigue subside.  He passes a dark, tarry stool.  Later, his back \"relaxes\" and starts \"popping\" a lot.    Episodes used to put him in bed for about 1 week.  Lately, the episodes are shorter.  There are no associated headaches.    He takes \"pro-resolving mediators,\" which are omega-3 fatty acid based.  He is unsure if this is helping him or not.  Edible CBD/THC probably helps his cognition somewhat.    MEDICAL AND SURGICAL HISTORY:  Past Medical History:   Diagnosis Date   • Allergy     Seasonal, cephalosporins   • Asthma     Seasonal asthma   • " Heart attack (HCC) 1990    drug induced MI (meth and cocaine)   • Hyperlipidemia    • IBD (inflammatory bowel disease)     constipation   • Substance abuse (HCC) 1990    Meth and cocaine - clean since 1990     Past Surgical History:   Procedure Laterality Date   • OTHER Bilateral 08/2020    vasectomy reversal   • VASECTOMY Bilateral 2013     MEDICATIONS:  Current Outpatient Medications   Medication Sig   • aspirin 81 MG EC tablet Take 1 Tablet by mouth every day.   • naproxen (NAPROSYN) 500 MG Tab Take 1 Tablet by mouth 2 times a day with meals.   • oxymetazoline (AFRIN) 0.05 % Solution Administer 2 Sprays into affected nostril(S) 2 times a day. 5 minutes before nasal steroid. Do not use longer than 3 days   • cetirizine (ZYRTEC) 10 MG Tab Take 10 mg by mouth every day.   • fluticasone (FLONASE) 50 MCG/ACT nasal spray Administer 1 Spray into affected nostril(S) every day.   • acyclovir (ZOVIRAX) 200 MG Cap Take 300 mg by mouth as needed.   • rosuvastatin (CRESTOR) 10 MG Tab Take 1 Tablet by mouth every evening. (Patient not taking: Reported on 10/8/2021)     SOCIAL HISTORY:  Social History     Tobacco Use   • Smoking status: Never Smoker   • Smokeless tobacco: Never Used   Substance Use Topics   • Alcohol use: Yes     Alcohol/week: 0.6 - 1.2 oz     Types: 1 - 2 Standard drinks or equivalent per week     Social History     Social History Narrative   • Not on file     FAMILY HISTORY:  Family History   Problem Relation Age of Onset   • Cancer Mother         breast   • Psychiatric Illness Mother         schizophrenia   • Diabetes Father    • Heart Disease Father    • Hypertension Father    • Hyperlipidemia Father    • Other Sister         ovarian cysts   • Drug abuse Brother         OD   • Alcohol abuse Paternal Grandmother    • Alcohol abuse Paternal Grandfather    • Hyperlipidemia Brother    • Allergies Brother    • No Known Problems Sister      REVIEW OF SYSTEMS:  A ROS was completed.  Pertinent positives and  "negatives were included in the HPI, above.  All other systems were reviewed and are negative.    PHYSICAL EXAM:  General/Medical:  - NAD  - hair, skin, nails, and joints were normal    Neuro:  MENTAL STATUS: awake and alert; no deficits of speech or language; oriented to person, place, and time; affect was appropriate to situation    CRANIAL NERVES:    II: acuity: J1+/J1+, fields: intact to confrontation, pupils: 3/3 to 2/2 without a relative afferent pupillary defect, discs: sharp    III/IV/VI: versions: intact without nystagmus    V: facial sensation: symmetric to light touch    VII: facial expression: symmetric    VIII: hearing: intact to finger rub    IX/X: palate: elevates symmetrically    XI: shoulder shrug: symmetric    XII: tongue: midline    MOTOR:  - bulk: normal throughout  - tone: normal throughout  Upper Extremity Strength  (R/L)    5/5   Elbow flexion 5/5   Elbow extension 5/5   Shoulder abduction 5/5     Lower Extremity Strength  (R/L)   Hip flexion 5/5   Knee extension 5/5   Knee flexion 5/5   Ankle plantarflexion 5/5   Ankle dorsiflexion 5/5     - can walk on toes and heels  - pronator drift: absent  - abnormal movements: none    SENSATION:  - light touch: grossly intact over the upper and lower extremities  - vibration (R/L, seconds): NT/NT at the great toes  - pinprick: NT  - proprioception: NT  - Romberg: absent    COORDINATION:  - finger to nose: normal, no ataxia on exam  - finger tapping: rapid and accurate, bilaterally    REFLEXES:  Reflex Right Left   BR 2+ 2+   Biceps 2+ 2+   Triceps 2+ 2+   Patellae 2+ 2+   Achilles 2+ 2+   Toes NT NT     GAIT:  - narrow base and normal  - heel-raised/toe-raised gait: intact/intact  - tandem gait: intact    REVIEW OF IMAGING STUDIES: I reviewed the following studies:  MRI Brain:  Date: 7/13/2021  W/o and w/ contrast?: yes  Indication: \"mental status change; episode of debilitating fatigue; intermittent confusion\"  Comparison: none  Impression:  \"MRI of " "the brain without and with contrast within normal limits.\"    REVIEW OF LABORATORY STUDIES:  No recent data available for review.    ASSESSMENT:  Fermín Ortiz is a 45 y.o. man with cyclic back pain, thigh pain, and altered mental status as well as a history most notable for HSV2, hand foot and mouth disease, Hantavirus infection, chronic fatigue, right-sided sciatica, and MI.  Fermín's symptoms do not fit any known neurologic condition as far as I am aware.  We reviewed his MRI brain and cervical spine during today's visit, and there are certainly no structural abnormalities which would explain his symptoms.    PLAN:  Cyclical, Transient Neurologic Symptoms  - conservative management  - no additional workup at this time    Follow-Up:  - No follow-ups on file.    Signed: Kevin Kovacs M.D.    BILLING DOCUMENTATION:   I spent 52 minutes reviewing the medical record, interviewing and examining the patient, discussing my impression (see \"assessment\" above), and coordinating care.  "

## 2022-10-06 ENCOUNTER — OFFICE VISIT (OUTPATIENT)
Dept: MEDICAL GROUP | Facility: CLINIC | Age: 47
End: 2022-10-06
Payer: MEDICAID

## 2022-10-06 VITALS
OXYGEN SATURATION: 96 % | DIASTOLIC BLOOD PRESSURE: 74 MMHG | WEIGHT: 200 LBS | TEMPERATURE: 98.1 F | SYSTOLIC BLOOD PRESSURE: 112 MMHG | HEIGHT: 70 IN | BODY MASS INDEX: 28.63 KG/M2 | RESPIRATION RATE: 18 BRPM | HEART RATE: 66 BPM

## 2022-10-06 DIAGNOSIS — Z00.00 ROUTINE PHYSICAL EXAMINATION: ICD-10-CM

## 2022-10-06 DIAGNOSIS — E78.5 DYSLIPIDEMIA: ICD-10-CM

## 2022-10-06 DIAGNOSIS — R10.13 EPIGASTRIC ABDOMINAL PAIN: ICD-10-CM

## 2022-10-06 DIAGNOSIS — R79.89 ABNORMAL CBC: ICD-10-CM

## 2022-10-06 DIAGNOSIS — E55.9 VITAMIN D DEFICIENCY: ICD-10-CM

## 2022-10-06 DIAGNOSIS — R53.82 CHRONIC FATIGUE: ICD-10-CM

## 2022-10-06 DIAGNOSIS — I25.2 PERSONAL HISTORY OF MI (MYOCARDIAL INFARCTION): ICD-10-CM

## 2022-10-06 PROCEDURE — 99214 OFFICE O/P EST MOD 30 MIN: CPT | Performed by: PHYSICIAN ASSISTANT

## 2022-10-06 ASSESSMENT — FIBROSIS 4 INDEX: FIB4 SCORE: 0.88

## 2022-10-06 ASSESSMENT — PATIENT HEALTH QUESTIONNAIRE - PHQ9: CLINICAL INTERPRETATION OF PHQ2 SCORE: 0

## 2022-10-06 NOTE — PROGRESS NOTES
Chief Complaint   Patient presents with    GI Problem     2 weeks, pain in abdomin. Wasn't able to eat for 2 days and not using the restroom for bowel movements for 2 1/2 days. Started to get dizzy when standing        HISTORY OF PRESENT ILLNESS: Patient is a 46 y.o. male established patient who presents today to discuss the following issues:    Epigastric abdominal pain  Patient states he has a history of GI issues. Recent worsening of epigastric abdominal pain and constipation. Believes that systemic inflammation is contributing. He is requesting a referral to gastroenterology for further evaluation. Referral placed.    Routine physical examination  Patient is due for annual routine fasting labs. Orders placed today and will follow up to discuss results in 3 weeks.    Chronic fatigue  Chronic condition for this patient. He believes that chronic inflammation in his body is the cause of his symptoms. He has been doing things to decrease his inflammation and this has been helping with his symptoms. Due for routine labs.      Patient Active Problem List    Diagnosis Date Noted    Routine physical examination 10/19/2022    Epigastric abdominal pain 10/06/2022    DDD (degenerative disc disease), thoracic 08/31/2021    Bilateral leg pain 06/11/2021    Chronic fatigue 05/27/2021    Personal history of Myocardial Infarction due to drugs 05/27/2021    Hantavirus infection 05/27/2021    Right sided sciatica 04/16/2019    HPV exposure 12/13/2018    Nasal congestion 11/12/2018    Hypertrophy of nasal turbinates 10/15/2018    Sprain of anterior cruciate ligament of left knee 04/29/2015       Allergies:Cephalosporins    Current Outpatient Medications   Medication Sig Dispense Refill    aspirin 81 MG EC tablet Take 1 Tablet by mouth every day. 90 Tablet 3    cetirizine (ZYRTEC) 10 MG Tab Take 10 mg by mouth every day.      fluticasone (FLONASE) 50 MCG/ACT nasal spray Administer 1 Spray into affected nostril(S) every day.       acyclovir (ZOVIRAX) 200 MG Cap Take 300 mg by mouth as needed.      oxymetazoline (AFRIN) 0.05 % Solution Administer 2 Sprays into affected nostril(S) 2 times a day. 5 minutes before nasal steroid. Do not use longer than 3 days       No current facility-administered medications for this visit.       Social History     Tobacco Use    Smoking status: Never    Smokeless tobacco: Never   Vaping Use    Vaping Use: Never used   Substance Use Topics    Alcohol use: Yes     Alcohol/week: 0.6 - 1.2 oz     Types: 1 - 2 Standard drinks or equivalent per week    Drug use: Yes     Frequency: 0.2 times per week     Types: Marijuana, Oral     Comment: Medical maraarmand currently. Meth and cocaine - clean since        Family Status   Relation Name Status    Mo      Fa  Alive    Sis  Alive    Bro      MGMo unknown Other    MGFa unknown Other    PGMo      PGFa      Bro  Alive    Sis  Alive     Family History   Problem Relation Age of Onset    Cancer Mother         breast    Psychiatric Illness Mother         schizophrenia    Diabetes Father     Heart Disease Father     Hypertension Father     Hyperlipidemia Father     Other Sister         ovarian cysts    Drug abuse Brother         OD    Alcohol abuse Paternal Grandmother     Alcohol abuse Paternal Grandfather     Hyperlipidemia Brother     Allergies Brother     No Known Problems Sister        Review of Systems:   Constitutional: Positive for chronic fatigue. Negative for fever, chills, weight loss and malaise.   HENT: Negative for ear pain, nosebleeds, congestion, sore throat and neck pain.    Eyes: Negative for blurred vision.   Respiratory: Negative for cough, sputum production, shortness of breath and wheezing.    Cardiovascular: Negative for chest pain, palpitations, orthopnea and leg swelling.   Gastrointestinal: Negative for heartburn, nausea, vomiting. Positive for worsening abdominal pain.   Genitourinary: Negative for dysuria, urgency and  "frequency.   Musculoskeletal: Negative for myalgias, back pain and joint pain.   Skin: Negative for rash and itching.   Neurological: Negative for dizziness, tingling, tremors, sensory change, focal weakness and headaches.   Endo/Heme/Allergies: Does not bruise/bleed easily.   Psychiatric/Behavioral: Negative for depression, suicidal ideas and memory loss.  The patient is not nervous/anxious and does not have insomnia.    All other systems reviewed and are negative except as in HPI.    Wt Readings from Last 3 Encounters:   10/06/22 90.7 kg (200 lb)   10/08/21 86.1 kg (189 lb 13.1 oz)   09/29/21 84.4 kg (186 lb)   ]    Exam:  /74 (BP Location: Right arm, Patient Position: Sitting, BP Cuff Size: Small adult)   Pulse 66   Temp 36.7 °C (98.1 °F) (Temporal)   Resp 18   Ht 1.778 m (5' 10\")   Wt 90.7 kg (200 lb)   SpO2 96%  Body mass index is 28.7 kg/m².   General:  Well nourished, well developed male. No apparent distress. Not ill appearing.  Eyes: EOM intact, PERRL, conjunctiva non-injected, sclera non-icteric.  Neck: Supple with no cervical lymphadenopathy, JVD, palpable thyroid nodules or carotid bruits.  Pulmonary: Clear to ausculation bilaterally. Normal effort. No rales, ronchi, or wheezing.  Cardiovascular: Regular rate and rhythm without murmur, rub or gallop.   Abdomen:  Soft, non-distended, mildly tender over epigastric area with normal bowel sounds. No CVA tenderness  Extremities: Full range of motion. Warm and well perfused with no edema.  Skin: Intact with no obvious rashes or lesions.  Neuro: Cranial nerves I-XII grossly intact.  Psych: Alert and oriented x 3.  Appropriately dressed. Mood and affect appropriate.    Assessment/Plan:  1. Routine physical examination        2. Epigastric abdominal pain  Referral to Gastroenterology    Comp Metabolic Panel      3. Chronic fatigue  CBC WITH DIFFERENTIAL    Comp Metabolic Panel    FREE THYROXINE    TSH    IRON/TOTAL IRON BIND    FOLATE    VITAMIN B2 " (RIBOFLAVIN)      4. Personal history of Myocardial Infarction due to drugs  Comp Metabolic Panel    Lipid Profile      5. Vitamin D deficiency  VITAMIN D,25 HYDROXY (DEFICIENCY)      6. Dyslipidemia  Lipid Profile      7. Abnormal CBC  CBC WITH DIFFERENTIAL    IRON/TOTAL IRON BIND    FOLATE    VITAMIN B2 (RIBOFLAVIN)          Reviewed risks and benefits of treatment plan. Patient verbally agrees to plan of care.     Return in about 3 weeks (around 10/27/2022) for f/u labs.    Please note that this dictation was created using voice recognition software. I have made every reasonable attempt to correct obvious errors, but I expect that there are errors of grammar and possibly content that I did not discover before finalizing the note.

## 2022-10-10 ENCOUNTER — HOSPITAL ENCOUNTER (OUTPATIENT)
Dept: LAB | Facility: MEDICAL CENTER | Age: 47
End: 2022-10-10
Attending: PHYSICIAN ASSISTANT
Payer: MEDICAID

## 2022-10-10 DIAGNOSIS — I25.2 PERSONAL HISTORY OF MI (MYOCARDIAL INFARCTION): ICD-10-CM

## 2022-10-10 DIAGNOSIS — R53.82 CHRONIC FATIGUE: ICD-10-CM

## 2022-10-10 DIAGNOSIS — R10.13 EPIGASTRIC ABDOMINAL PAIN: ICD-10-CM

## 2022-10-10 DIAGNOSIS — R79.89 ABNORMAL CBC: ICD-10-CM

## 2022-10-10 DIAGNOSIS — E55.9 VITAMIN D DEFICIENCY: ICD-10-CM

## 2022-10-10 DIAGNOSIS — E78.5 DYSLIPIDEMIA: ICD-10-CM

## 2022-10-10 LAB
25(OH)D3 SERPL-MCNC: 32 NG/ML (ref 30–100)
ALBUMIN SERPL BCP-MCNC: 4 G/DL (ref 3.2–4.9)
ALBUMIN/GLOB SERPL: 1.2 G/DL
ALP SERPL-CCNC: 72 U/L (ref 30–99)
ALT SERPL-CCNC: 8 U/L (ref 2–50)
ANION GAP SERPL CALC-SCNC: 13 MMOL/L (ref 7–16)
AST SERPL-CCNC: 15 U/L (ref 12–45)
BASOPHILS # BLD AUTO: 0.5 % (ref 0–1.8)
BASOPHILS # BLD: 0.04 K/UL (ref 0–0.12)
BILIRUB SERPL-MCNC: 0.9 MG/DL (ref 0.1–1.5)
BUN SERPL-MCNC: 14 MG/DL (ref 8–22)
CALCIUM SERPL-MCNC: 9.1 MG/DL (ref 8.5–10.5)
CHLORIDE SERPL-SCNC: 103 MMOL/L (ref 96–112)
CHOLEST SERPL-MCNC: 221 MG/DL (ref 100–199)
CO2 SERPL-SCNC: 22 MMOL/L (ref 20–33)
CREAT SERPL-MCNC: 0.95 MG/DL (ref 0.5–1.4)
EOSINOPHIL # BLD AUTO: 0.14 K/UL (ref 0–0.51)
EOSINOPHIL NFR BLD: 1.7 % (ref 0–6.9)
ERYTHROCYTE [DISTWIDTH] IN BLOOD BY AUTOMATED COUNT: 41.9 FL (ref 35.9–50)
FASTING STATUS PATIENT QL REPORTED: NORMAL
FOLATE SERPL-MCNC: 23.3 NG/ML
GFR SERPLBLD CREATININE-BSD FMLA CKD-EPI: 99 ML/MIN/1.73 M 2
GLOBULIN SER CALC-MCNC: 3.4 G/DL (ref 1.9–3.5)
GLUCOSE SERPL-MCNC: 83 MG/DL (ref 65–99)
HCT VFR BLD AUTO: 42 % (ref 42–52)
HDLC SERPL-MCNC: 36 MG/DL
HGB BLD-MCNC: 14.5 G/DL (ref 14–18)
IMM GRANULOCYTES # BLD AUTO: 0.02 K/UL (ref 0–0.11)
IMM GRANULOCYTES NFR BLD AUTO: 0.2 % (ref 0–0.9)
IRON SATN MFR SERPL: 44 % (ref 15–55)
IRON SERPL-MCNC: 109 UG/DL (ref 50–180)
LDLC SERPL CALC-MCNC: 167 MG/DL
LYMPHOCYTES # BLD AUTO: 2.58 K/UL (ref 1–4.8)
LYMPHOCYTES NFR BLD: 32 % (ref 22–41)
MCH RBC QN AUTO: 33.3 PG (ref 27–33)
MCHC RBC AUTO-ENTMCNC: 34.5 G/DL (ref 33.7–35.3)
MCV RBC AUTO: 96.3 FL (ref 81.4–97.8)
MONOCYTES # BLD AUTO: 0.67 K/UL (ref 0–0.85)
MONOCYTES NFR BLD AUTO: 8.3 % (ref 0–13.4)
NEUTROPHILS # BLD AUTO: 4.62 K/UL (ref 1.82–7.42)
NEUTROPHILS NFR BLD: 57.3 % (ref 44–72)
NRBC # BLD AUTO: 0 K/UL
NRBC BLD-RTO: 0 /100 WBC
PLATELET # BLD AUTO: 229 K/UL (ref 164–446)
PMV BLD AUTO: 10.7 FL (ref 9–12.9)
POTASSIUM SERPL-SCNC: 4.3 MMOL/L (ref 3.6–5.5)
PROT SERPL-MCNC: 7.4 G/DL (ref 6–8.2)
RBC # BLD AUTO: 4.36 M/UL (ref 4.7–6.1)
SODIUM SERPL-SCNC: 138 MMOL/L (ref 135–145)
T4 FREE SERPL-MCNC: 1.39 NG/DL (ref 0.93–1.7)
TIBC SERPL-MCNC: 249 UG/DL (ref 250–450)
TRIGL SERPL-MCNC: 91 MG/DL (ref 0–149)
TSH SERPL DL<=0.005 MIU/L-ACNC: 1.15 UIU/ML (ref 0.38–5.33)
UIBC SERPL-MCNC: 140 UG/DL (ref 110–370)
WBC # BLD AUTO: 8.1 K/UL (ref 4.8–10.8)

## 2022-10-10 PROCEDURE — 84439 ASSAY OF FREE THYROXINE: CPT

## 2022-10-10 PROCEDURE — 83550 IRON BINDING TEST: CPT

## 2022-10-10 PROCEDURE — 80061 LIPID PANEL: CPT

## 2022-10-10 PROCEDURE — 83540 ASSAY OF IRON: CPT

## 2022-10-10 PROCEDURE — 36415 COLL VENOUS BLD VENIPUNCTURE: CPT

## 2022-10-10 PROCEDURE — 84252 ASSAY OF VITAMIN B-2: CPT

## 2022-10-10 PROCEDURE — 85025 COMPLETE CBC W/AUTO DIFF WBC: CPT

## 2022-10-10 PROCEDURE — 84443 ASSAY THYROID STIM HORMONE: CPT

## 2022-10-10 PROCEDURE — 80053 COMPREHEN METABOLIC PANEL: CPT

## 2022-10-10 PROCEDURE — 82306 VITAMIN D 25 HYDROXY: CPT

## 2022-10-10 PROCEDURE — 82746 ASSAY OF FOLIC ACID SERUM: CPT

## 2022-10-12 ENCOUNTER — TELEPHONE (OUTPATIENT)
Dept: HEALTH INFORMATION MANAGEMENT | Facility: OTHER | Age: 47
End: 2022-10-12

## 2022-10-12 NOTE — TELEPHONE ENCOUNTER
1. Caller Name: Fermín Ortiz                          Call Back Number: [phone]        How would the patient prefer to be contacted with a response: [email or web address] message    2. SPECIFIC Action To Be Taken: Referral pending, please sign.    3. Diagnosis/Clinical Reason for Request: Skin Check for changing moles     4. Specialty & Provider Name/Lab/Imaging Location: [facility]     5. Is appointment scheduled for requested order/referral: no    Patient was informed they will receive a return phone call from the office ONLY if there are any questions before processing their request. Advised to call back if they haven't received a call from the referral department in 5 days.

## 2022-10-17 LAB — VIT B2 SERPL-SCNC: 13 NMOL/L (ref 5–50)

## 2022-10-19 PROBLEM — Z00.00 ROUTINE PHYSICAL EXAMINATION: Status: ACTIVE | Noted: 2022-10-19

## 2022-10-19 PROBLEM — R10.13 EPIGASTRIC ABDOMINAL PAIN: Status: ACTIVE | Noted: 2022-10-06

## 2022-10-19 PROBLEM — R10.13 EPIGASTRIC ABDOMINAL PAIN: Status: ACTIVE | Noted: 2022-10-19

## 2022-10-20 NOTE — ASSESSMENT & PLAN NOTE
Patient is due for annual routine fasting labs. Orders placed today and will follow up to discuss results in 3 weeks.

## 2022-10-20 NOTE — ASSESSMENT & PLAN NOTE
Chronic condition for this patient. He believes that chronic inflammation in his body is the cause of his symptoms. He has been doing things to decrease his inflammation and this has been helping with his symptoms. Due for routine labs.

## 2022-10-20 NOTE — ASSESSMENT & PLAN NOTE
Patient states he has a history of GI issues. Recent worsening of epigastric abdominal pain and constipation. Believes that systemic inflammation is contributing. He is requesting a referral to gastroenterology for further evaluation. Referral placed.

## 2022-11-01 ENCOUNTER — OFFICE VISIT (OUTPATIENT)
Dept: MEDICAL GROUP | Facility: CLINIC | Age: 47
End: 2022-11-01
Payer: MEDICAID

## 2022-11-01 VITALS
BODY MASS INDEX: 25.48 KG/M2 | HEART RATE: 81 BPM | SYSTOLIC BLOOD PRESSURE: 102 MMHG | RESPIRATION RATE: 16 BRPM | DIASTOLIC BLOOD PRESSURE: 60 MMHG | OXYGEN SATURATION: 96 % | HEIGHT: 70 IN | WEIGHT: 178 LBS | TEMPERATURE: 98.3 F

## 2022-11-01 DIAGNOSIS — E78.00 PURE HYPERCHOLESTEROLEMIA: ICD-10-CM

## 2022-11-01 DIAGNOSIS — R10.13 EPIGASTRIC ABDOMINAL PAIN: ICD-10-CM

## 2022-11-01 PROCEDURE — 99214 OFFICE O/P EST MOD 30 MIN: CPT | Performed by: PHYSICIAN ASSISTANT

## 2022-11-01 RX ORDER — ASPIRIN 81 MG/1
1 TABLET ORAL DAILY
COMMUNITY
End: 2024-03-15

## 2022-11-01 RX ORDER — ROSUVASTATIN CALCIUM 10 MG/1
10 TABLET, COATED ORAL EVERY EVENING
Qty: 90 TABLET | Refills: 3 | Status: SHIPPED | OUTPATIENT
Start: 2022-11-01 | End: 2024-03-15

## 2022-11-01 ASSESSMENT — FIBROSIS 4 INDEX: FIB4 SCORE: 1.07

## 2022-11-01 NOTE — ASSESSMENT & PLAN NOTE
Chronic condition. Recently seen by GI for an EGD. Had biopsies taken. Exam showed inflammation from gastroesophageal junction, throughout entire stomach. We are waiting the results of the biopsy. He was started on medications by GI but has not picked these up yet. He believes that all of his inflammation is caused by mast cell activation from eating too large of a meal. States it does not occur when he eats smaller meals. He has no other symptoms of mast cell.  He has been encouraged to  and start taking the prescribed medication and follow up with GI as directed.

## 2022-11-01 NOTE — PROGRESS NOTES
Chief Complaint   Patient presents with    Results     Labs/ Endoscopy        HISTORY OF PRESENT ILLNESS: Patient is a 46 y.o. male established patient who presents today to discuss the following issues:    Assessment/Plan  Pure hypercholesterolemia  Chronic condition. He was previously on  Statin medication but stopped taking it. States he was trying to control with lifestyle modification only. Levels continue to be elevated. Restart rosuvastatin 10 mg and recheck labs in 6 months.  Uncontrolled    Epigastric abdominal pain  Chronic condition. Recently seen by GI for an EGD. Had biopsies taken. Exam showed inflammation from gastroesophageal junction, throughout entire stomach. We are waiting the results of the biopsy. He was started on medications by GI but has not picked these up yet. He believes that all of his inflammation is caused by mast cell activation from eating too large of a meal. States it does not occur when he eats smaller meals. He has no other symptoms of mast cell.  He has been encouraged to  and start taking the prescribed medication and follow up with GI as directed.      Reviewed risks and benefits of treatment plan. Patient verbally agrees to plan of care.     Patient Active Problem List    Diagnosis Date Noted    Routine physical examination 10/19/2022    Pure hypercholesterolemia 10/10/2022    Epigastric abdominal pain 10/06/2022    DDD (degenerative disc disease), thoracic 08/31/2021    Bilateral leg pain 06/11/2021    Chronic fatigue 05/27/2021    Personal history of Myocardial Infarction due to drugs 05/27/2021    Hantavirus infection 05/27/2021    Right sided sciatica 04/16/2019    HPV exposure 12/13/2018    Nasal congestion 11/12/2018    Hypertrophy of nasal turbinates 10/15/2018    Sprain of anterior cruciate ligament of left knee 04/29/2015       Allergies:Cephalosporins    Current Outpatient Medications   Medication Sig Dispense Refill    rosuvastatin (CRESTOR) 10 MG Tab Take 1  "Tablet by mouth every evening. 90 Tablet 3    aspirin 81 MG EC tablet Take 1 Tablet by mouth every day.      oxymetazoline (AFRIN) 0.05 % Solution Administer 2 Sprays into affected nostril(S) 2 times a day. 5 minutes before nasal steroid. Do not use longer than 3 days      cetirizine (ZYRTEC) 10 MG Tab Take 10 mg by mouth every day.      fluticasone (FLONASE) 50 MCG/ACT nasal spray Administer 1 Spray into affected nostril(S) every day.      acyclovir (ZOVIRAX) 200 MG Cap Take 300 mg by mouth as needed.       No current facility-administered medications for this visit.       Wt Readings from Last 3 Encounters:   11/01/22 80.7 kg (178 lb)   10/06/22 90.7 kg (200 lb)   10/08/21 86.1 kg (189 lb 13.1 oz)   ]  No LMP for male patient.    Exam:  /60 (BP Location: Right arm, Patient Position: Sitting, BP Cuff Size: Adult)   Pulse 81   Temp 36.8 °C (98.3 °F) (Temporal)   Resp 16   Ht 1.778 m (5' 10\")   Wt 80.7 kg (178 lb)   SpO2 96%  Body mass index is 25.54 kg/m².   General:  Well nourished, well developed male. No apparent distress. Not ill appearing.  Eyes: EOM intact, PERRL, conjunctiva non-injected, sclera non-icteric.  Neck: Supple with no cervical lymphadenopathy, JVD, palpable thyroid nodules or carotid bruits.  Pulmonary: Clear to ausculation bilaterally. Normal effort. No rales, ronchi, or wheezing.  Cardiovascular: Regular rate and rhythm without murmur, rub or gallop.   Extremities: Full range of motion. Warm and well perfused with no edema.  Skin: Intact with no obvious rashes or lesions.  Neuro: Cranial nerves I-XII grossly intact.  Psych: Alert and oriented x 3.  Appropriately dressed. Mood and affect appropriate.    Return in about 6 months (around 5/1/2023) for f/u GI issues.  Please note that this dictation was created using voice recognition software. I have made every reasonable attempt to correct obvious errors, but I expect that there are errors of grammar and possibly content that I did not " discover before finalizing the note.

## 2022-11-01 NOTE — ASSESSMENT & PLAN NOTE
Chronic condition. He was previously on  Statin medication but stopped taking it. States he was trying to control with lifestyle modification only. Levels continue to be elevated. Restart rosuvastatin 10 mg and recheck labs in 6 months.  Uncontrolled

## 2022-11-04 RX ORDER — ACYCLOVIR 400 MG/1
400 TABLET ORAL 2 TIMES DAILY
Qty: 20 TABLET | Refills: 0 | Status: SHIPPED | OUTPATIENT
Start: 2022-11-04 | End: 2022-12-09

## 2022-11-15 ENCOUNTER — RESEARCH ENCOUNTER (OUTPATIENT)
Dept: RESEARCH | Facility: WORKSITE | Age: 47
End: 2022-11-15
Payer: MEDICAID

## 2022-12-08 NOTE — TELEPHONE ENCOUNTER
Received request via: Pharmacy    Was the patient seen in the last year in this department? Yes    Does the patient have an active prescription (recently filled or refills available) for medication(s) requested? No    Does the patient have nursing home Plus and need 100 day supply (blood pressure, diabetes and cholesterol meds only)? Patient does not have SCP    Last OV 11/1/2022  Last Labs 10/10/2022  Next OV 5/2/203

## 2022-12-09 RX ORDER — PANTOPRAZOLE SODIUM 40 MG/1
1 TABLET, DELAYED RELEASE ORAL DAILY
COMMUNITY
Start: 2022-12-03

## 2022-12-09 RX ORDER — ACYCLOVIR 400 MG/1
TABLET ORAL
Qty: 20 TABLET | Refills: 0 | Status: SHIPPED | OUTPATIENT
Start: 2022-12-09 | End: 2023-01-25

## 2023-01-23 NOTE — TELEPHONE ENCOUNTER
Received request via: Pharmacy    Was the patient seen in the last year in this department? Yes    Does the patient have an active prescription (recently filled or refills available) for medication(s) requested? No    Does the patient have [facility] and need 100 day supply (blood pressure, diabetes and cholesterol meds only)? Patient does not have SCP  Last OV 11/1/22  Last Labs 10/10/22

## 2023-01-25 RX ORDER — LINACLOTIDE 72 UG/1
1 CAPSULE, GELATIN COATED ORAL DAILY
COMMUNITY
Start: 2023-01-23

## 2023-01-25 RX ORDER — ACYCLOVIR 400 MG/1
TABLET ORAL
Qty: 20 TABLET | Refills: 0 | Status: SHIPPED | OUTPATIENT
Start: 2023-01-25 | End: 2023-03-02 | Stop reason: SDUPTHER

## 2023-03-23 ENCOUNTER — OFFICE VISIT (OUTPATIENT)
Dept: URGENT CARE | Facility: PHYSICIAN GROUP | Age: 48
End: 2023-03-23
Payer: MEDICAID

## 2023-03-23 VITALS
OXYGEN SATURATION: 98 % | RESPIRATION RATE: 12 BRPM | HEART RATE: 101 BPM | WEIGHT: 183 LBS | TEMPERATURE: 98 F | HEIGHT: 70 IN | SYSTOLIC BLOOD PRESSURE: 116 MMHG | DIASTOLIC BLOOD PRESSURE: 64 MMHG | BODY MASS INDEX: 26.2 KG/M2

## 2023-03-23 DIAGNOSIS — R19.7 DIARRHEA, UNSPECIFIED TYPE: ICD-10-CM

## 2023-03-23 DIAGNOSIS — K59.00 CONSTIPATION, UNSPECIFIED CONSTIPATION TYPE: ICD-10-CM

## 2023-03-23 DIAGNOSIS — R10.13 EPIGASTRIC ABDOMINAL PAIN: ICD-10-CM

## 2023-03-23 PROCEDURE — 99214 OFFICE O/P EST MOD 30 MIN: CPT | Performed by: STUDENT IN AN ORGANIZED HEALTH CARE EDUCATION/TRAINING PROGRAM

## 2023-03-23 RX ORDER — FLUCONAZOLE 200 MG/1
TABLET ORAL
Qty: 4 TABLET | Refills: 0 | Status: SHIPPED | OUTPATIENT
Start: 2023-03-23 | End: 2024-03-15

## 2023-03-23 RX ORDER — RIFAXIMIN 550 MG/1
TABLET ORAL
COMMUNITY
Start: 2023-03-14 | End: 2024-03-15

## 2023-03-23 ASSESSMENT — ENCOUNTER SYMPTOMS
CHILLS: 0
HEADACHES: 0
HEARTBURN: 1
SORE THROAT: 0
PALPITATIONS: 0
VOMITING: 0
CONSTIPATION: 1
FEVER: 0
DIZZINESS: 0
BLOOD IN STOOL: 0
DIARRHEA: 1
WHEEZING: 0
ABDOMINAL PAIN: 1
NAUSEA: 0
COUGH: 0
SHORTNESS OF BREATH: 0

## 2023-03-23 ASSESSMENT — FIBROSIS 4 INDEX: FIB4 SCORE: 1.09

## 2023-03-23 NOTE — PROGRESS NOTES
Subjective     Fermín Ortiz is a 47 y.o. male who presents with Constipation (X2weeks )            Fermín is a 47 y.o. male who presnts to urgent care for epigastric and stomach pain.  Patient states that this is a recurrent problem for him.  Patient states that he has had referral out to GI and has been in undergoing testing to find the cause of his symptoms.  Patient states recent EGD showed eosinophilic esophagitis.  Patient's wife is with him and states she believes that eosinophilic esophagitis is due to Candida infection.  Patient patient's wife request that we treat for like esophagitis with like Candida infection.  Patient/patient's wife states that they have been to get in contact with GI but have been unable to do so.  Patient states over the last 2 weeks he has had increased fatigue.  Patient reports of having episodes of constipation.  Constipation recently turned into diarrhea.  Patient reports 4 episodes of diarrhea yesterday.  Patient has not had bowel movement today.  No fever/chills.  No sore throat, nasal congestion or cough.  Patient states abdominal discomfort feels like inflammation of his stomach.  Patient cannot localize pain to one specific area and is more generalized.  Patient is supposed to be going to his dad's  and is not feeling well.  Again patient requesting treatment for Candida to see if treatment for Candida improves overall symptoms.      Review of Systems   Constitutional:  Positive for malaise/fatigue. Negative for chills and fever.   HENT:  Negative for congestion, ear pain and sore throat.    Respiratory:  Negative for cough, shortness of breath and wheezing.    Cardiovascular:  Negative for chest pain and palpitations.   Gastrointestinal:  Positive for abdominal pain, constipation, diarrhea and heartburn. Negative for blood in stool, melena, nausea and vomiting.   Neurological:  Negative for dizziness and headaches.   All other systems reviewed and are  "negative.           Objective     /64   Pulse (!) 101   Temp 36.7 °C (98 °F) (Temporal)   Resp 12   Ht 1.778 m (5' 10\")   Wt 83 kg (183 lb)   SpO2 98%   BMI 26.26 kg/m²      Physical Exam  Vitals reviewed.   Constitutional:       General: He is not in acute distress.     Appearance: Normal appearance. He is not ill-appearing or toxic-appearing.   HENT:      Head: Normocephalic and atraumatic.      Nose: Nose normal.      Mouth/Throat:      Mouth: Mucous membranes are moist.      Pharynx: Oropharynx is clear.   Eyes:      Extraocular Movements: Extraocular movements intact.      Conjunctiva/sclera: Conjunctivae normal.      Pupils: Pupils are equal, round, and reactive to light.   Cardiovascular:      Rate and Rhythm: Normal rate and regular rhythm.   Pulmonary:      Effort: Pulmonary effort is normal.      Breath sounds: Normal breath sounds.   Abdominal:      General: Abdomen is flat. Bowel sounds are normal. There is no distension.      Palpations: Abdomen is soft.      Tenderness: There is generalized abdominal tenderness. There is no guarding or rebound.   Skin:     General: Skin is warm and dry.   Neurological:      General: No focal deficit present.      Mental Status: He is alert. Mental status is at baseline.                           Assessment & Plan        1. Epigastric abdominal pain  - fluconazole (DIFLUCAN) 200 MG Tab; 400 mg on day 1, then 200 mg once daily for day 2 and day 3.  Dispense: 4 Tablet; Refill: 0  - Patient states he has had EGD which showed significant esophagitis.  Patient requesting treatment for candida esophagitis to see if it improves symptoms. Will initiate therapy but advised to follow up with GI. Patient should see improvement within 72 hours, although discussed that treatment is typically 14 to 21 days. Advised to follow up with GI ASAP.  - Patient also reports pantoprazole 40 mg daily.  - Patient declined GI cocktail in clinic today.    2. Constipation, unspecified " constipation type    3. Diarrhea, unspecified type    Supportive care measures and indications for immediate follow-up discussed with patient. Strict ER precautions discussed. Advised to follow up with GI.    My total time spent caring for the patient on the day of the encounter was 30 minutes reviewing patient's chart, obtaining patient history, physical exam, discussing differential diagnosis, plan of care, appropriate follow-up and indications for immediate follow-up. This does not include time spent on separately billable procedures/tests.     Instructed to return to urgent care or nearest emergency department if symptoms fail to improve, for any change in condition, further concerns, or new concerning symptoms.    Patient states understanding and agrees with the plan of care and discharge instructions.

## 2023-03-24 ENCOUNTER — HOSPITAL ENCOUNTER (OUTPATIENT)
Facility: MEDICAL CENTER | Age: 48
End: 2023-03-24
Payer: MEDICAID

## 2023-03-24 PROCEDURE — 87507 IADNA-DNA/RNA PROBE TQ 12-25: CPT

## 2023-03-27 LAB
ADV 40+41 DNA STL QL NAA+NON-PROBE: NOT DETECTED
ASTRO TYP 1-8 RNA STL QL NAA+NON-PROBE: NOT DETECTED
C CAYETANENSIS DNA STL QL NAA+NON-PROBE: NOT DETECTED
C COLI+JEJ+UPSA DNA STL QL NAA+NON-PROBE: NOT DETECTED
CRYPTOSP DNA STL QL NAA+NON-PROBE: NOT DETECTED
E COLI O157 DNA STL QL NAA+NON-PROBE: NORMAL
E HISTOLYT DNA STL QL NAA+NON-PROBE: NOT DETECTED
EAEC PAA PLAS AGGR+AATA ST NAA+NON-PRB: NOT DETECTED
EC STX1+STX2 GENES STL QL NAA+NON-PROBE: NOT DETECTED
EPEC EAE GENE STL QL NAA+NON-PROBE: NOT DETECTED
ETEC LTA+ST1A+ST1B TOX ST NAA+NON-PROBE: NOT DETECTED
G LAMBLIA DNA STL QL NAA+NON-PROBE: NOT DETECTED
NOROVIRUS GI+II RNA STL QL NAA+NON-PROBE: NORMAL
P SHIGELLOIDES DNA STL QL NAA+NON-PROBE: NOT DETECTED
RVA RNA STL QL NAA+NON-PROBE: NORMAL
S ENT+BONG DNA STL QL NAA+NON-PROBE: NOT DETECTED
SAPO I+II+IV+V RNA STL QL NAA+NON-PROBE: NORMAL
SHIGELLA SP+EIEC IPAH ST NAA+NON-PROBE: NOT DETECTED
V CHOL+PARA+VUL DNA STL QL NAA+NON-PROBE: NOT DETECTED
V CHOLERAE DNA STL QL NAA+NON-PROBE: NOT DETECTED
Y ENTEROCOL DNA STL QL NAA+NON-PROBE: NOT DETECTED

## 2023-05-03 ENCOUNTER — HOSPITAL ENCOUNTER (OUTPATIENT)
Dept: LAB | Facility: MEDICAL CENTER | Age: 48
End: 2023-05-03
Attending: PHYSICIAN ASSISTANT
Payer: MEDICAID

## 2023-05-03 DIAGNOSIS — R10.13 EPIGASTRIC ABDOMINAL PAIN: ICD-10-CM

## 2023-05-03 DIAGNOSIS — E78.00 PURE HYPERCHOLESTEROLEMIA: ICD-10-CM

## 2023-05-03 LAB
ALBUMIN SERPL BCP-MCNC: 4.6 G/DL (ref 3.2–4.9)
ALBUMIN/GLOB SERPL: 1.6 G/DL
ALP SERPL-CCNC: 48 U/L (ref 30–99)
ALT SERPL-CCNC: 13 U/L (ref 2–50)
ANION GAP SERPL CALC-SCNC: 13 MMOL/L (ref 7–16)
AST SERPL-CCNC: 16 U/L (ref 12–45)
BILIRUB SERPL-MCNC: 0.9 MG/DL (ref 0.1–1.5)
BUN SERPL-MCNC: 12 MG/DL (ref 8–22)
CALCIUM ALBUM COR SERPL-MCNC: 8.6 MG/DL (ref 8.5–10.5)
CALCIUM SERPL-MCNC: 9.1 MG/DL (ref 8.5–10.5)
CHLORIDE SERPL-SCNC: 103 MMOL/L (ref 96–112)
CHOLEST SERPL-MCNC: 216 MG/DL (ref 100–199)
CO2 SERPL-SCNC: 23 MMOL/L (ref 20–33)
CREAT SERPL-MCNC: 1.04 MG/DL (ref 0.5–1.4)
FASTING STATUS PATIENT QL REPORTED: NORMAL
GFR SERPLBLD CREATININE-BSD FMLA CKD-EPI: 89 ML/MIN/1.73 M 2
GLOBULIN SER CALC-MCNC: 2.8 G/DL (ref 1.9–3.5)
GLUCOSE SERPL-MCNC: 87 MG/DL (ref 65–99)
HDLC SERPL-MCNC: 37 MG/DL
LDLC SERPL CALC-MCNC: 161 MG/DL
POTASSIUM SERPL-SCNC: 4.3 MMOL/L (ref 3.6–5.5)
PROT SERPL-MCNC: 7.4 G/DL (ref 6–8.2)
SODIUM SERPL-SCNC: 139 MMOL/L (ref 135–145)
TRIGL SERPL-MCNC: 90 MG/DL (ref 0–149)

## 2023-05-03 PROCEDURE — 80053 COMPREHEN METABOLIC PANEL: CPT

## 2023-05-03 PROCEDURE — 36415 COLL VENOUS BLD VENIPUNCTURE: CPT

## 2023-05-03 PROCEDURE — 80061 LIPID PANEL: CPT

## 2023-06-30 ENCOUNTER — HOSPITAL ENCOUNTER (OUTPATIENT)
Dept: LAB | Facility: MEDICAL CENTER | Age: 48
End: 2023-06-30
Payer: MEDICAID

## 2023-06-30 LAB
BASOPHILS # BLD AUTO: 0.9 % (ref 0–1.8)
BASOPHILS # BLD: 0.06 K/UL (ref 0–0.12)
EOSINOPHIL # BLD AUTO: 0.17 K/UL (ref 0–0.51)
EOSINOPHIL NFR BLD: 2.5 % (ref 0–6.9)
ERYTHROCYTE [DISTWIDTH] IN BLOOD BY AUTOMATED COUNT: 42.1 FL (ref 35.9–50)
HCT VFR BLD AUTO: 45.3 % (ref 42–52)
HGB BLD-MCNC: 15.9 G/DL (ref 14–18)
IMM GRANULOCYTES # BLD AUTO: 0.01 K/UL (ref 0–0.11)
IMM GRANULOCYTES NFR BLD AUTO: 0.1 % (ref 0–0.9)
LYMPHOCYTES # BLD AUTO: 2.58 K/UL (ref 1–4.8)
LYMPHOCYTES NFR BLD: 37.5 % (ref 22–41)
MCH RBC QN AUTO: 33.8 PG (ref 27–33)
MCHC RBC AUTO-ENTMCNC: 35.1 G/DL (ref 32.3–36.5)
MCV RBC AUTO: 96.4 FL (ref 81.4–97.8)
MONOCYTES # BLD AUTO: 0.48 K/UL (ref 0–0.85)
MONOCYTES NFR BLD AUTO: 7 % (ref 0–13.4)
NEUTROPHILS # BLD AUTO: 3.58 K/UL (ref 1.82–7.42)
NEUTROPHILS NFR BLD: 52 % (ref 44–72)
NRBC # BLD AUTO: 0 K/UL
NRBC BLD-RTO: 0 /100 WBC (ref 0–0.2)
PLATELET # BLD AUTO: 228 K/UL (ref 164–446)
PMV BLD AUTO: 9.7 FL (ref 9–12.9)
RBC # BLD AUTO: 4.7 M/UL (ref 4.7–6.1)
WBC # BLD AUTO: 6.9 K/UL (ref 4.8–10.8)

## 2023-06-30 PROCEDURE — 36415 COLL VENOUS BLD VENIPUNCTURE: CPT

## 2023-06-30 PROCEDURE — 80053 COMPREHEN METABOLIC PANEL: CPT

## 2023-06-30 PROCEDURE — 85025 COMPLETE CBC W/AUTO DIFF WBC: CPT

## 2023-07-01 LAB
ALBUMIN SERPL BCP-MCNC: 5 G/DL (ref 3.2–4.9)
ALBUMIN/GLOB SERPL: 1.9 G/DL
ALP SERPL-CCNC: 55 U/L (ref 30–99)
ALT SERPL-CCNC: 13 U/L (ref 2–50)
ANION GAP SERPL CALC-SCNC: 11 MMOL/L (ref 7–16)
AST SERPL-CCNC: 16 U/L (ref 12–45)
BILIRUB SERPL-MCNC: 1.1 MG/DL (ref 0.1–1.5)
BUN SERPL-MCNC: 16 MG/DL (ref 8–22)
CALCIUM ALBUM COR SERPL-MCNC: 9 MG/DL (ref 8.5–10.5)
CALCIUM SERPL-MCNC: 9.8 MG/DL (ref 8.5–10.5)
CHLORIDE SERPL-SCNC: 102 MMOL/L (ref 96–112)
CO2 SERPL-SCNC: 27 MMOL/L (ref 20–33)
CREAT SERPL-MCNC: 0.98 MG/DL (ref 0.5–1.4)
GFR SERPLBLD CREATININE-BSD FMLA CKD-EPI: 95 ML/MIN/1.73 M 2
GLOBULIN SER CALC-MCNC: 2.6 G/DL (ref 1.9–3.5)
GLUCOSE SERPL-MCNC: 93 MG/DL (ref 65–99)
POTASSIUM SERPL-SCNC: 4.2 MMOL/L (ref 3.6–5.5)
PROT SERPL-MCNC: 7.6 G/DL (ref 6–8.2)
SODIUM SERPL-SCNC: 140 MMOL/L (ref 135–145)

## 2023-08-10 ENCOUNTER — HOSPITAL ENCOUNTER (OUTPATIENT)
Dept: LAB | Facility: MEDICAL CENTER | Age: 48
End: 2023-08-10
Attending: INTERNAL MEDICINE
Payer: MEDICAID

## 2023-08-10 PROCEDURE — 36415 COLL VENOUS BLD VENIPUNCTURE: CPT

## 2023-08-10 PROCEDURE — 83520 IMMUNOASSAY QUANT NOS NONAB: CPT

## 2023-08-13 LAB — TRYPTASE SERPL-MCNC: 7.2 UG/L

## 2023-08-27 ENCOUNTER — APPOINTMENT (OUTPATIENT)
Dept: RADIOLOGY | Facility: MEDICAL CENTER | Age: 48
End: 2023-08-27
Attending: EMERGENCY MEDICINE
Payer: MEDICAID

## 2023-08-27 ENCOUNTER — HOSPITAL ENCOUNTER (EMERGENCY)
Facility: MEDICAL CENTER | Age: 48
End: 2023-08-27
Attending: EMERGENCY MEDICINE
Payer: MEDICAID

## 2023-08-27 VITALS
OXYGEN SATURATION: 94 % | DIASTOLIC BLOOD PRESSURE: 72 MMHG | RESPIRATION RATE: 18 BRPM | WEIGHT: 169.97 LBS | BODY MASS INDEX: 24.39 KG/M2 | HEART RATE: 51 BPM | TEMPERATURE: 97.7 F | SYSTOLIC BLOOD PRESSURE: 126 MMHG

## 2023-08-27 DIAGNOSIS — R10.13 EPIGASTRIC PAIN: ICD-10-CM

## 2023-08-27 LAB
ALBUMIN SERPL BCP-MCNC: 4.9 G/DL (ref 3.2–4.9)
ALBUMIN/GLOB SERPL: 1.6 G/DL
ALP SERPL-CCNC: 53 U/L (ref 30–99)
ALT SERPL-CCNC: 16 U/L (ref 2–50)
ANION GAP SERPL CALC-SCNC: 14 MMOL/L (ref 7–16)
APPEARANCE UR: CLEAR
AST SERPL-CCNC: 20 U/L (ref 12–45)
BASOPHILS # BLD AUTO: 0.3 % (ref 0–1.8)
BASOPHILS # BLD: 0.02 K/UL (ref 0–0.12)
BILIRUB SERPL-MCNC: 0.7 MG/DL (ref 0.1–1.5)
BILIRUB UR QL STRIP.AUTO: NEGATIVE
BUN SERPL-MCNC: 15 MG/DL (ref 8–22)
CALCIUM ALBUM COR SERPL-MCNC: 9.1 MG/DL (ref 8.5–10.5)
CALCIUM SERPL-MCNC: 9.8 MG/DL (ref 8.5–10.5)
CHLORIDE SERPL-SCNC: 100 MMOL/L (ref 96–112)
CO2 SERPL-SCNC: 22 MMOL/L (ref 20–33)
COLOR UR: YELLOW
CREAT SERPL-MCNC: 0.94 MG/DL (ref 0.5–1.4)
EKG IMPRESSION: NORMAL
EOSINOPHIL # BLD AUTO: 0.01 K/UL (ref 0–0.51)
EOSINOPHIL NFR BLD: 0.1 % (ref 0–6.9)
ERYTHROCYTE [DISTWIDTH] IN BLOOD BY AUTOMATED COUNT: 40.1 FL (ref 35.9–50)
GFR SERPLBLD CREATININE-BSD FMLA CKD-EPI: 100 ML/MIN/1.73 M 2
GLOBULIN SER CALC-MCNC: 3 G/DL (ref 1.9–3.5)
GLUCOSE SERPL-MCNC: 115 MG/DL (ref 65–99)
GLUCOSE UR STRIP.AUTO-MCNC: NEGATIVE MG/DL
HCT VFR BLD AUTO: 45.1 % (ref 42–52)
HGB BLD-MCNC: 15.8 G/DL (ref 14–18)
IMM GRANULOCYTES # BLD AUTO: 0.01 K/UL (ref 0–0.11)
IMM GRANULOCYTES NFR BLD AUTO: 0.1 % (ref 0–0.9)
KETONES UR STRIP.AUTO-MCNC: ABNORMAL MG/DL
LEUKOCYTE ESTERASE UR QL STRIP.AUTO: NEGATIVE
LIPASE SERPL-CCNC: 30 U/L (ref 11–82)
LYMPHOCYTES # BLD AUTO: 1.95 K/UL (ref 1–4.8)
LYMPHOCYTES NFR BLD: 29.1 % (ref 22–41)
MCH RBC QN AUTO: 33.2 PG (ref 27–33)
MCHC RBC AUTO-ENTMCNC: 35 G/DL (ref 32.3–36.5)
MCV RBC AUTO: 94.7 FL (ref 81.4–97.8)
MICRO URNS: ABNORMAL
MONOCYTES # BLD AUTO: 0.24 K/UL (ref 0–0.85)
MONOCYTES NFR BLD AUTO: 3.6 % (ref 0–13.4)
NEUTROPHILS # BLD AUTO: 4.47 K/UL (ref 1.82–7.42)
NEUTROPHILS NFR BLD: 66.8 % (ref 44–72)
NITRITE UR QL STRIP.AUTO: NEGATIVE
NRBC # BLD AUTO: 0 K/UL
NRBC BLD-RTO: 0 /100 WBC (ref 0–0.2)
PH UR STRIP.AUTO: 7.5 [PH] (ref 5–8)
PLATELET # BLD AUTO: 224 K/UL (ref 164–446)
PMV BLD AUTO: 8.8 FL (ref 9–12.9)
POTASSIUM SERPL-SCNC: 4.1 MMOL/L (ref 3.6–5.5)
PROT SERPL-MCNC: 7.9 G/DL (ref 6–8.2)
PROT UR QL STRIP: NEGATIVE MG/DL
RBC # BLD AUTO: 4.76 M/UL (ref 4.7–6.1)
RBC UR QL AUTO: NEGATIVE
SODIUM SERPL-SCNC: 136 MMOL/L (ref 135–145)
SP GR UR STRIP.AUTO: 1.01
TROPONIN T SERPL-MCNC: 13 NG/L (ref 6–19)
UROBILINOGEN UR STRIP.AUTO-MCNC: 0.2 MG/DL
WBC # BLD AUTO: 6.7 K/UL (ref 4.8–10.8)

## 2023-08-27 PROCEDURE — 81003 URINALYSIS AUTO W/O SCOPE: CPT

## 2023-08-27 PROCEDURE — 93005 ELECTROCARDIOGRAM TRACING: CPT | Performed by: EMERGENCY MEDICINE

## 2023-08-27 PROCEDURE — 85025 COMPLETE CBC W/AUTO DIFF WBC: CPT

## 2023-08-27 PROCEDURE — A9270 NON-COVERED ITEM OR SERVICE: HCPCS | Performed by: EMERGENCY MEDICINE

## 2023-08-27 PROCEDURE — 36415 COLL VENOUS BLD VENIPUNCTURE: CPT

## 2023-08-27 PROCEDURE — 80053 COMPREHEN METABOLIC PANEL: CPT

## 2023-08-27 PROCEDURE — 700102 HCHG RX REV CODE 250 W/ 637 OVERRIDE(OP): Performed by: EMERGENCY MEDICINE

## 2023-08-27 PROCEDURE — 84484 ASSAY OF TROPONIN QUANT: CPT

## 2023-08-27 PROCEDURE — 700117 HCHG RX CONTRAST REV CODE 255: Mod: UD | Performed by: EMERGENCY MEDICINE

## 2023-08-27 PROCEDURE — 74177 CT ABD & PELVIS W/CONTRAST: CPT

## 2023-08-27 PROCEDURE — 99284 EMERGENCY DEPT VISIT MOD MDM: CPT

## 2023-08-27 PROCEDURE — 83690 ASSAY OF LIPASE: CPT

## 2023-08-27 RX ORDER — SUCRALFATE 1 G/1
1 TABLET ORAL
Qty: 120 TABLET | Refills: 3 | Status: SHIPPED | OUTPATIENT
Start: 2023-08-27

## 2023-08-27 RX ADMIN — LIDOCAINE HYDROCHLORIDE 30 ML: 20 SOLUTION OROPHARYNGEAL at 18:34

## 2023-08-27 RX ADMIN — IOHEXOL 100 ML: 350 INJECTION, SOLUTION INTRAVENOUS at 19:45

## 2023-08-27 ASSESSMENT — FIBROSIS 4 INDEX: FIB4 SCORE: 0.91

## 2023-08-27 NOTE — ED TRIAGE NOTES
Pt comes in complaining of a rash to his is abd. Pt stating when he eats the he has severe R sided abd pain.

## 2023-08-28 NOTE — DISCHARGE INSTRUCTIONS
Follow-up with your doctor.  Take Carafate and continue your pantoprazole.  Follow-up with your doctor for continued work-up as an outpatient.  Return to the department for continued pain, worsening pain, fever, vomiting or other concerns.  Follow-up with your GI doctor.

## 2023-08-28 NOTE — ED PROVIDER NOTES
ER Provider Note    Scribed for Ugo Johnson M.d. by Velia Roman. 8/27/2023  5:03 PM    Primary Care Provider: Renata Collazo P.A.-C.    CHIEF COMPLAINT  Chief Complaint   Patient presents with    Abdominal Pain    Rash     EXTERNAL RECORDS REVIEWED  Outpatient Notes Patient follows up with [facility] Office regularly.      HPI/ROS  LIMITATION TO HISTORY   Select: : None  OUTSIDE HISTORIAN(S):  None    Fermín Ortiz is a 47 y.o. male who presents to the ED complaining of abdominal pain onset one week ago. Patient has associated abdominal spasms, comfort.  He notes the pain wax and wanes. He notes he hasn't been eating to try and avoid these symptoms. He had a meal last night and then had spasms last night. He took a Benadryl last night. His pain and spasms are exacerbated when he lays down. He notes a history of GI problems.  He states he been having GI symptoms for 5 years since he had hantavirus.  He has had an endoscopy and colonoscopy which have been normal.  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  Denies any bloody or black stools.  No chest pain or shortness of breath.  Minimal pain in the right side.  He is also concerned about possible pancreatitis.  He has been taking medication as prescribed and he notes it alleviates his pain initially but not for long. No other known medical problems. Patient has had an endoscopy in the past. Patient denies cough or shortness of breath.  Patient is a rash is concerned about mast cell activation this is somewhat longstanding.  Has not acutely changed.    PAST MEDICAL HISTORY  Past Medical History:   Diagnosis Date    Allergy     Seasonal, cephalosporins    Asthma     Seasonal asthma    Heart attack (HCC) 1990    drug induced MI (meth and cocaine)    Hyperlipidemia     IBD (inflammatory bowel disease)     constipation    Substance abuse (HCC) 1990    Meth and cocaine - clean since 1990      SURGICAL HISTORY  Past Surgical History:   Procedure Laterality Date    OTHER Bilateral 08/2020    vasectomy reversal    VASECTOMY Bilateral 2013     FAMILY HISTORY  Family History   Problem Relation Age of Onset    Cancer Mother         breast    Psychiatric Illness Mother         schizophrenia    Diabetes Father     Heart Disease Father     Hypertension Father     Hyperlipidemia Father     Other Sister         ovarian cysts    Drug abuse Brother         OD    Alcohol abuse Paternal Grandmother     Alcohol abuse Paternal Grandfather     Hyperlipidemia Brother     Allergies Brother     No Known Problems Sister      SOCIAL HISTORY   reports that he has never smoked. He has never used smokeless tobacco. He reports current alcohol use of about 0.6 - 1.2 oz of alcohol per week. He reports current drug use. Frequency: 0.20 times per week. Drugs: Marijuana and Oral.    CURRENT MEDICATIONS  Previous Medications    ACYCLOVIR (ZOVIRAX) 400 MG TABLET    Take 1 tablet by mouth twice daily    ASPIRIN 81 MG EC TABLET    Take 1 Tablet by mouth every day.    CETIRIZINE (ZYRTEC) 10 MG TAB    Take 10 mg by mouth every day.    FLUCONAZOLE (DIFLUCAN) 200 MG TAB    400 mg on day 1, then 200 mg once daily for day 2 and day 3.    FLUTICASONE (FLONASE) 50 MCG/ACT NASAL SPRAY    Administer 1 Spray into affected nostril(S) every day.    HYOSCYAMINE (LEVSIN) 0.125 MG SL TAB        LINZESS 72 MCG CAP    Take 1 Capsule by mouth every day.    OXYMETAZOLINE (AFRIN) 0.05 % SOLUTION    Administer 2 Sprays into affected nostril(S) 2 times a day. 5 minutes before nasal steroid. Do not use longer than 3 days    PANTOPRAZOLE (PROTONIX) 40 MG TABLET DELAYED RESPONSE    Take 1 Tablet by mouth every day.    ROSUVASTATIN (CRESTOR) 10 MG TAB    Take 1 Tablet by mouth every evening.    XIFAXAN 550 MG TAB TABLET    TAKE 1 TABLET BY MOUTH THREE TIMES DAILY FOR 14 DAYS     ALLERGIES  Cephalosporins    PHYSICAL EXAM  /80   Pulse 79   Temp 36.9 °C  (98.5 °F) (Temporal)   Resp 14   Wt 77.1 kg (169 lb 15.6 oz)   SpO2 97% Comment: RA  BMI 24.39 kg/m²   Constitutional: Well developed, Well nourished, No acute distress, Non-toxic appearance.   HENT: Normocephalic, Atraumatic, Bilateral external ears normal, Oropharynx moist, No oral exudates, Nose normal.   Eyes: PERRL, EOMI, Conjunctiva normal, No discharge.   Neck: Normal range of motion, No tenderness, Supple, No stridor.   Cardiovascular: Normal heart rate, Normal rhythm, No murmurs, No rubs, No gallops.   Thorax & Lungs: Normal breath sounds, No respiratory distress, No wheezing, No chest tenderness.   Abdomen: Bowel sounds normal, Soft, minimal epigastric tenderness.  No right upper quadrant tenderness.  Negative Escalante sign.  Skin: Warm, Dry, No erythema, No rash.   Back: No tenderness, No CVA tenderness.   Musculoskeletal: Good range of motion in all major joints.  Neurologic: Alert,, No focal deficits noted.   Psychiatric: Affect normal     DIAGNOSTIC STUDIES  Labs:   Results for orders placed or performed during the hospital encounter of 08/27/23   CBC WITH DIFFERENTIAL   Result Value Ref Range    WBC 6.7 4.8 - 10.8 K/uL    RBC 4.76 4.70 - 6.10 M/uL    Hemoglobin 15.8 14.0 - 18.0 g/dL    Hematocrit 45.1 42.0 - 52.0 %    MCV 94.7 81.4 - 97.8 fL    MCH 33.2 (H) 27.0 - 33.0 pg    MCHC 35.0 32.3 - 36.5 g/dL    RDW 40.1 35.9 - 50.0 fL    Platelet Count 224 164 - 446 K/uL    MPV 8.8 (L) 9.0 - 12.9 fL    Neutrophils-Polys 66.80 44.00 - 72.00 %    Lymphocytes 29.10 22.00 - 41.00 %    Monocytes 3.60 0.00 - 13.40 %    Eosinophils 0.10 0.00 - 6.90 %    Basophils 0.30 0.00 - 1.80 %    Immature Granulocytes 0.10 0.00 - 0.90 %    Nucleated RBC 0.00 0.00 - 0.20 /100 WBC    Neutrophils (Absolute) 4.47 1.82 - 7.42 K/uL    Lymphs (Absolute) 1.95 1.00 - 4.80 K/uL    Monos (Absolute) 0.24 0.00 - 0.85 K/uL    Eos (Absolute) 0.01 0.00 - 0.51 K/uL    Baso (Absolute) 0.02 0.00 - 0.12 K/uL    Immature Granulocytes (abs) 0.01  0.00 - 0.11 K/uL    NRBC (Absolute) 0.00 K/uL   COMP METABOLIC PANEL   Result Value Ref Range    Sodium 136 135 - 145 mmol/L    Potassium 4.1 3.6 - 5.5 mmol/L    Chloride 100 96 - 112 mmol/L    Co2 22 20 - 33 mmol/L    Anion Gap 14.0 7.0 - 16.0    Glucose 115 (H) 65 - 99 mg/dL    Bun 15 8 - 22 mg/dL    Creatinine 0.94 0.50 - 1.40 mg/dL    Calcium 9.8 8.5 - 10.5 mg/dL    Correct Calcium 9.1 8.5 - 10.5 mg/dL    AST(SGOT) 20 12 - 45 U/L    ALT(SGPT) 16 2 - 50 U/L    Alkaline Phosphatase 53 30 - 99 U/L    Total Bilirubin 0.7 0.1 - 1.5 mg/dL    Albumin 4.9 3.2 - 4.9 g/dL    Total Protein 7.9 6.0 - 8.2 g/dL    Globulin 3.0 1.9 - 3.5 g/dL    A-G Ratio 1.6 g/dL   LIPASE   Result Value Ref Range    Lipase 30 11 - 82 U/L   URINALYSIS    Specimen: Blood   Result Value Ref Range    Color Yellow     Character Clear     Specific Gravity 1.010 <1.035    Ph 7.5 5.0 - 8.0    Glucose Negative Negative mg/dL    Ketones Trace (A) Negative mg/dL    Protein Negative Negative mg/dL    Bilirubin Negative Negative    Urobilinogen, Urine 0.2 Negative    Nitrite Negative Negative    Leukocyte Esterase Negative Negative    Occult Blood Negative Negative    Micro Urine Req see below    ESTIMATED GFR   Result Value Ref Range    GFR (CKD-EPI) 100 >60 mL/min/1.73 m 2   EKG (NOW)   Result Value Ref Range    Report       [facility] Emergency Dept.    Test Date:  2023  Pt Name:    GALLO PENA                Department: ER  MRN:        [id number]                      Room:       [facility]  Gender:     Male                         Technician: [id number]  :        1975                   Requested By:JEFERSON KIM  Order #:    [id number]                    Reading MD:    Measurements  Intervals                                Axis  Rate:       63                           P:          58  IN:         142                          QRS:        59  QRSD:       104                          T:          41  QT:         431  QTc:         442    Interpretive Statements  Sinus rhythm  No previous ECG available for comparison       Radiology:   The attending emergency physician has independently interpreted the diagnostic imaging associated with this visit and am waiting the final reading from the radiologist.   Preliminary interpretation is a follows: I reviewed the CT I do not see any other acute abnormality the radiologist does not remark upon.  Agree with radiologist remark.  Radiologist interpretation:   CT-ABDOMEN-PELVIS WITH   Final Result      1.  Negative CT scan abdomen and pelvis with contrast      2.  Normal appendix        COURSE & MEDICAL DECISION MAKING     ED Observation Status?     INITIAL ASSESSMENT, COURSE AND PLAN  Care Narrative:   5:04 PM - Patient was seen and evaluated at bedside. Patient presents to the ED for abdominal pain onset one week ago.  After my exam, I discussed with the patient the plan of care, which includes treating with medication for their symptoms and obtaining lab work and imaging for further evaluation. Patient understands and verbalizes agreement to plan of care. Patient will be treated with GI cocktail 30 mL. Ordered CT-abdomen-pelvis with, CBC w/ diff, CMP, lipase and UA to evaluate.     Differential diagnoses include but not limited to: Gastritis, pancreatitis, biliary disease, colitis, H. pylori, GERD, ACS, pulmonary etiology, and anxiety.    The patient is worked up with labs and imaging for the above differential diagnosis.  He is felt unlikely to have ACS as he has abdominal discomfort and EKG is reassuring.  His lungs are clear.  He feels a cracking sensation in his chest and I did abdominal CT does not show any abnormality in the lower part of the lungs.  CBC, CMP and lipase were all unremarkable except for a mildly elevated glucose which is nonfasting so interpret is normal.  Urinalysis is normal.    Patient states she had gallbladder imaging about 5 years ago when he developed the symptoms.  That  was normal.  His pain is really not on the right side.  It is postprandial with recent concern about biliary dysfunction however he does not have focal tenderness of right upper quadrant, he does not have a fever and he does not have focal tenderness therefore I think is unlikely to be biliary in nature.    We will give the patient a p.o. challenge was given a GI cocktail he felt better.  I would continue his PPI we will put him on Carafate and can be discharged home to pursue further work-up as an outpatient.  Rash that he complains about is  somewhat longstanding and nonspecific this could benefit from outpatient work-up.    At this point I am still waiting for a troponin think is unlikely this is cardiac but since he has pain basically is back and is in his epigastrium at his troponin that is negative he will be discharged home to follow-up with his doctor.  His questions were answered is agreeable to plan.        DISPOSITION AND DISCUSSIONS      Escalation of care considered, and ultimately not performed: diagnostic imaging.  Considered ultrasound but elected to hold off at this time.      Renata Collazo P.A.-C.  [address] [address]  [phone]                FINAL DIAGNOSIS  1. Epigastric pain           The note accurately reflects work and decisions made by me.  Ugo Johnson M.D.  8/27/2023  8:40 PM

## 2023-09-07 ENCOUNTER — APPOINTMENT (OUTPATIENT)
Dept: MEDICAL GROUP | Facility: CLINIC | Age: 48
End: 2023-09-07
Payer: MEDICAID

## 2023-09-07 ENCOUNTER — TELEPHONE (OUTPATIENT)
Dept: MEDICAL GROUP | Facility: CLINIC | Age: 48
End: 2023-09-07

## 2023-09-07 NOTE — TELEPHONE ENCOUNTER
Caller Name: Fermín Ortiz   Call Back Number: [phone] (home)       How would the patient prefer to be contacted with a response: Phone call OK to leave a detailed message    Pt called and is requesting a referral to [facility] with Dr. Evangelist Gann. Informed patient he may need to be sen in order to get the referral and he stated there should be enough information in his chart to get this referral taken care of and that is what he had an appointment with Renata Collazo to go over

## 2023-10-30 ENCOUNTER — DOCUMENTATION (OUTPATIENT)
Dept: HEALTH INFORMATION MANAGEMENT | Facility: OTHER | Age: 48
End: 2023-10-30
Payer: MEDICAID

## 2024-01-23 DIAGNOSIS — R79.89 ABNORMAL CBC: ICD-10-CM

## 2024-01-23 RX ORDER — ACYCLOVIR 400 MG/1
400 TABLET ORAL 2 TIMES DAILY
Qty: 20 TABLET | Refills: 3 | Status: SHIPPED | OUTPATIENT
Start: 2024-01-23

## 2024-01-23 NOTE — TELEPHONE ENCOUNTER
Received request via: Pharmacy    Was the patient seen in the last year in this department? No    Does the patient have an active prescription (recently filled or refills available) for medication(s) requested? No    Pharmacy Name: [facility]    Does the patient have assisted Plus and need 100 day supply (blood pressure, diabetes and cholesterol meds only)? Patient does not have SCP      Last office visit- 11-1-22  Last labs- 8-27-23

## 2024-02-16 ENCOUNTER — HOSPITAL ENCOUNTER (OUTPATIENT)
Dept: LAB | Facility: MEDICAL CENTER | Age: 49
End: 2024-02-16
Attending: INTERNAL MEDICINE
Payer: COMMERCIAL

## 2024-02-16 ENCOUNTER — HOSPITAL ENCOUNTER (OUTPATIENT)
Dept: LAB | Facility: MEDICAL CENTER | Age: 49
End: 2024-02-16
Attending: STUDENT IN AN ORGANIZED HEALTH CARE EDUCATION/TRAINING PROGRAM
Payer: COMMERCIAL

## 2024-02-16 LAB
CRP SERPL HS-MCNC: <0.3 MG/DL (ref 0–0.75)
ERYTHROCYTE [SEDIMENTATION RATE] IN BLOOD BY WESTERGREN METHOD: 6 MM/HOUR (ref 0–20)

## 2024-02-16 PROCEDURE — 85652 RBC SED RATE AUTOMATED: CPT

## 2024-02-16 PROCEDURE — 86364 TISS TRNSGLTMNASE EA IG CLAS: CPT | Mod: 91

## 2024-02-16 PROCEDURE — 83630 LACTOFERRIN FECAL (QUAL): CPT

## 2024-02-16 PROCEDURE — 86140 C-REACTIVE PROTEIN: CPT

## 2024-02-16 PROCEDURE — 36415 COLL VENOUS BLD VENIPUNCTURE: CPT

## 2024-02-16 PROCEDURE — 83993 ASSAY FOR CALPROTECTIN FECAL: CPT

## 2024-02-16 PROCEDURE — 82705 FATS/LIPIDS FECES QUAL: CPT

## 2024-02-16 PROCEDURE — 82653 EL-1 FECAL QUANTITATIVE: CPT

## 2024-02-16 PROCEDURE — 82784 ASSAY IGA/IGD/IGG/IGM EACH: CPT

## 2024-02-16 PROCEDURE — 86258 DGP ANTIBODY EACH IG CLASS: CPT

## 2024-02-16 PROCEDURE — 84403 ASSAY OF TOTAL TESTOSTERONE: CPT

## 2024-02-17 LAB
LACTOFERRIN STL QL IA: NEGATIVE
TESTOST SERPL-MCNC: 540 NG/DL (ref 175–781)

## 2024-02-19 LAB
FAT STL QL: NORMAL
GLIADIN IGA SER IA-ACNC: <0.72 FLU (ref 0–4.99)
GLIADIN IGG SER IA-ACNC: <0.56 FLU (ref 0–4.99)
IGA SERPL-MCNC: 289 MG/DL (ref 68–408)
NEUTRAL FAT STL QL: NORMAL
TTG IGA SER IA-ACNC: <1.02 FLU (ref 0–4.99)
TTG IGG SER IA-ACNC: <0.82 FLU (ref 0–4.99)

## 2024-02-21 LAB
CALPROTECTIN STL-MCNT: 13 UG/G
ELASTASE PANC STL-MCNT: >800 UG/G

## 2024-03-15 ENCOUNTER — OFFICE VISIT (OUTPATIENT)
Dept: URGENT CARE | Facility: PHYSICIAN GROUP | Age: 49
End: 2024-03-15
Payer: COMMERCIAL

## 2024-03-15 ENCOUNTER — HOSPITAL ENCOUNTER (OUTPATIENT)
Dept: LAB | Facility: MEDICAL CENTER | Age: 49
End: 2024-03-15
Attending: EMERGENCY MEDICINE
Payer: COMMERCIAL

## 2024-03-15 ENCOUNTER — HOSPITAL ENCOUNTER (OUTPATIENT)
Dept: LAB | Facility: MEDICAL CENTER | Age: 49
End: 2024-03-15
Attending: PHYSICIAN ASSISTANT
Payer: COMMERCIAL

## 2024-03-15 VITALS
BODY MASS INDEX: 25.48 KG/M2 | HEART RATE: 74 BPM | OXYGEN SATURATION: 97 % | DIASTOLIC BLOOD PRESSURE: 64 MMHG | WEIGHT: 178 LBS | RESPIRATION RATE: 16 BRPM | TEMPERATURE: 97.4 F | SYSTOLIC BLOOD PRESSURE: 102 MMHG | HEIGHT: 70 IN

## 2024-03-15 DIAGNOSIS — R53.83 FATIGUE, UNSPECIFIED TYPE: ICD-10-CM

## 2024-03-15 LAB
ALBUMIN SERPL BCP-MCNC: 4.8 G/DL (ref 3.2–4.9)
ALBUMIN/GLOB SERPL: 1.5 G/DL
ALP SERPL-CCNC: 60 U/L (ref 30–99)
ALT SERPL-CCNC: 13 U/L (ref 2–50)
ANION GAP SERPL CALC-SCNC: 14 MMOL/L (ref 7–16)
AST SERPL-CCNC: 23 U/L (ref 12–45)
BASOPHILS # BLD AUTO: 0.6 % (ref 0–1.8)
BASOPHILS # BLD: 0.04 K/UL (ref 0–0.12)
BILIRUB SERPL-MCNC: 0.5 MG/DL (ref 0.1–1.5)
BUN SERPL-MCNC: 19 MG/DL (ref 8–22)
CALCIUM ALBUM COR SERPL-MCNC: 9 MG/DL (ref 8.5–10.5)
CALCIUM SERPL-MCNC: 9.6 MG/DL (ref 8.5–10.5)
CHLORIDE SERPL-SCNC: 102 MMOL/L (ref 96–112)
CO2 SERPL-SCNC: 25 MMOL/L (ref 20–33)
CREAT SERPL-MCNC: 0.96 MG/DL (ref 0.5–1.4)
EOSINOPHIL # BLD AUTO: 0.12 K/UL (ref 0–0.51)
EOSINOPHIL NFR BLD: 1.8 % (ref 0–6.9)
ERYTHROCYTE [DISTWIDTH] IN BLOOD BY AUTOMATED COUNT: 40.9 FL (ref 35.9–50)
ERYTHROCYTE [SEDIMENTATION RATE] IN BLOOD BY WESTERGREN METHOD: 4 MM/HOUR (ref 0–20)
GFR SERPLBLD CREATININE-BSD FMLA CKD-EPI: 97 ML/MIN/1.73 M 2
GLOBULIN SER CALC-MCNC: 3.2 G/DL (ref 1.9–3.5)
GLUCOSE SERPL-MCNC: 93 MG/DL (ref 65–99)
HCT VFR BLD AUTO: 47 % (ref 42–52)
HGB BLD-MCNC: 16.4 G/DL (ref 14–18)
IMM GRANULOCYTES # BLD AUTO: 0.02 K/UL (ref 0–0.11)
IMM GRANULOCYTES NFR BLD AUTO: 0.3 % (ref 0–0.9)
INR PPP: 0.93 (ref 0.87–1.13)
LYMPHOCYTES # BLD AUTO: 3.15 K/UL (ref 1–4.8)
LYMPHOCYTES NFR BLD: 46.4 % (ref 22–41)
MCH RBC QN AUTO: 32.8 PG (ref 27–33)
MCHC RBC AUTO-ENTMCNC: 34.9 G/DL (ref 32.3–36.5)
MCV RBC AUTO: 94 FL (ref 81.4–97.8)
MONOCYTES # BLD AUTO: 0.44 K/UL (ref 0–0.85)
MONOCYTES NFR BLD AUTO: 6.5 % (ref 0–13.4)
NEUTROPHILS # BLD AUTO: 3.02 K/UL (ref 1.82–7.42)
NEUTROPHILS NFR BLD: 44.4 % (ref 44–72)
NRBC # BLD AUTO: 0 K/UL
NRBC BLD-RTO: 0 /100 WBC (ref 0–0.2)
PLATELET # BLD AUTO: 286 K/UL (ref 164–446)
PMV BLD AUTO: 9.3 FL (ref 9–12.9)
POTASSIUM SERPL-SCNC: 4.7 MMOL/L (ref 3.6–5.5)
PROT SERPL-MCNC: 8 G/DL (ref 6–8.2)
PROTHROMBIN TIME: 12.6 SEC (ref 12–14.6)
RBC # BLD AUTO: 5 M/UL (ref 4.7–6.1)
SODIUM SERPL-SCNC: 141 MMOL/L (ref 135–145)
WBC # BLD AUTO: 6.8 K/UL (ref 4.8–10.8)

## 2024-03-15 PROCEDURE — 3078F DIAST BP <80 MM HG: CPT | Performed by: PHYSICIAN ASSISTANT

## 2024-03-15 PROCEDURE — 99214 OFFICE O/P EST MOD 30 MIN: CPT | Performed by: PHYSICIAN ASSISTANT

## 2024-03-15 PROCEDURE — 85025 COMPLETE CBC W/AUTO DIFF WBC: CPT

## 2024-03-15 PROCEDURE — 85610 PROTHROMBIN TIME: CPT

## 2024-03-15 PROCEDURE — 86617 LYME DISEASE ANTIBODY: CPT | Mod: 91

## 2024-03-15 PROCEDURE — 86618 LYME DISEASE ANTIBODY: CPT

## 2024-03-15 PROCEDURE — 3074F SYST BP LT 130 MM HG: CPT | Performed by: PHYSICIAN ASSISTANT

## 2024-03-15 PROCEDURE — 80053 COMPREHEN METABOLIC PANEL: CPT

## 2024-03-15 PROCEDURE — 36415 COLL VENOUS BLD VENIPUNCTURE: CPT

## 2024-03-15 PROCEDURE — 85652 RBC SED RATE AUTOMATED: CPT

## 2024-03-15 ASSESSMENT — ENCOUNTER SYMPTOMS
CHILLS: 0
GASTROINTESTINAL NEGATIVE: 1
WEAKNESS: 1
FEVER: 0
RESPIRATORY NEGATIVE: 1
CARDIOVASCULAR NEGATIVE: 1

## 2024-03-15 ASSESSMENT — FIBROSIS 4 INDEX: FIB4 SCORE: 1.071428571428571429

## 2024-03-15 NOTE — PROGRESS NOTES
Subjective     Fermín Ortiz is a very pleasant 48 y.o. male who presents with Fatigue (X 6 years ago with POTS disease and marks in nailbed of his finger. Pt. Wants to be checked for Lyme disease. )            HPI  Patient requesting Lyme disease testing.  He has had vague neurological symptoms for 6 years.  He has been evaluated by his PCP and had multiple test but never been evaluated for Lyme disease.  He used to live in [place] around ticks.  He did have a random bite about 6 years ago which was red and swollen but unclear if it was a target lesion.  He is having fatigue, weakness, etc.      PMH:  has a past medical history of Allergy, Asthma, Heart attack ([facility]) (1990), Hyperlipidemia, IBD (inflammatory bowel disease), and Substance abuse ([facility]) (1990).    He has no past medical history of Addisons disease ([facility]), Adrenal disorder ([facility]), Anemia, Anxiety, Cancer ([facility]), Clotting disorder ([facility]), Depression, Diabetes ([facility]), GERD (gastroesophageal reflux disease), Head ache, Heart murmur, HIV (human immunodeficiency virus infection) ([facility]), Hypertension, Kidney disease, Migraine, Seizure ([facility]), or Thyroid disease.  MEDS:   Current Outpatient Medications:     acyclovir (ZOVIRAX) 400 MG tablet, Take 1 Tablet by mouth 2 times a day., Disp: 20 Tablet, Rfl: 3    sucralfate (CARAFATE) 1 GM Tab, Take 1 Tablet by mouth 4 Times a Day,Before Meals and at Bedtime., Disp: 120 Tablet, Rfl: 3    LINZESS 72 MCG Cap, Take 1 Capsule by mouth every day., Disp: , Rfl:     pantoprazole (PROTONIX) 40 MG Tablet Delayed Response, Take 1 Tablet by mouth every day., Disp: , Rfl:     cetirizine (ZYRTEC) 10 MG Tab, Take 10 mg by mouth every day., Disp: , Rfl:   ALLERGIES:   Allergies   Allergen Reactions    Cephalosporins Swelling     Took and went into the sun, broke out in rash on face       SURGHX:   Past Surgical History:   Procedure Laterality Date    OTHER Bilateral 08/2020    vasectomy reversal    VASECTOMY Bilateral 2013     SOCHX:   "reports that he has never smoked. He has never used smokeless tobacco. He reports current alcohol use of about 0.6 - 1.2 oz of alcohol per week. He reports current drug use. Frequency: 0.20 times per week. Drugs: Marijuana and Oral.  FH: family history includes Alcohol abuse in his paternal grandfather and paternal grandmother; Allergies in his brother; Cancer in his mother; Diabetes in his father; Drug abuse in his brother; Heart Disease in his father; Hyperlipidemia in his brother and father; Hypertension in his father; No Known Problems in his sister; Other in his sister; Psychiatric Illness in his mother.        Review of Systems   Constitutional:  Positive for malaise/fatigue. Negative for chills and fever.   HENT: Negative.     Respiratory: Negative.     Cardiovascular: Negative.    Gastrointestinal: Negative.    Neurological:  Positive for weakness.       Medications, Allergies, and current problem list reviewed today in Epic           Objective     /64   Pulse 74   Temp 36.3 °C (97.4 °F) (Temporal)   Resp 16   Ht 1.778 m (5' 10\")   Wt 80.7 kg (178 lb)   SpO2 97%   BMI 25.54 kg/m²      Physical Exam  Vitals and nursing note reviewed.   Constitutional:       General: He is not in acute distress.     Appearance: Normal appearance. He is well-developed. He is not diaphoretic.   HENT:      Head: Normocephalic and atraumatic.      Right Ear: Hearing and external ear normal.      Left Ear: Hearing and external ear normal.      Nose: Nose normal.      Mouth/Throat:      Mouth: Mucous membranes are moist.   Eyes:      General:         Right eye: No discharge.         Left eye: No discharge.      Conjunctiva/sclera: Conjunctivae normal.   Cardiovascular:      Rate and Rhythm: Normal rate and regular rhythm.   Pulmonary:      Effort: Pulmonary effort is normal. No respiratory distress.      Breath sounds: Normal breath sounds. No stridor. No wheezing, rhonchi or rales.   Musculoskeletal:      Cervical " back: Normal range of motion and neck supple.   Lymphadenopathy:      Cervical: No cervical adenopathy.   Skin:     General: Skin is warm and dry.   Neurological:      General: No focal deficit present.      Mental Status: He is alert and oriented to person, place, and time. Mental status is at baseline.      Sensory: No sensory deficit.      Motor: No weakness.      Gait: Gait normal.   Psychiatric:         Mood and Affect: Mood normal.         Behavior: Behavior normal.         Thought Content: Thought content normal.         Judgment: Judgment normal.                             Assessment & Plan        1. Fatigue, unspecified type  LYME WESTERN BLOT IGG + IGM    BORRELIA BURGDORFERI VLSE1/PEPC10 ANTIBODIES, TOTAL W/RFLX IGM/IGG (M)        Lyme disease testing ordered.  Will reach out with results about next steps.  All questions answered      I personally reviewed prior external notes and test results pertinent to today's visit. Return to clinic or go to ED if symptoms worsen or persist. Red flag symptoms and indications for ED discussed at length. Patient/Parent/Guardian voices understanding.  AVS with post-visit instructions provided or given verbally.  Follow-up with your primary care provider in 3-5 days. All side effects and potential interactions of prescribed medication discussed including allergic response, GI upset, tendon injury, rash, sedation, OCP effectiveness, etc.    Please note that this dictation was created using voice recognition software. I have made every reasonable attempt to correct obvious errors, but I expect that there are errors of grammar and possibly content that I did not discover before finalizing the note.

## 2024-03-18 LAB
B BURGDOR IGG SER QL IB: NEGATIVE
B BURGDOR IGM SER QL IB: NEGATIVE
B BURGDOR.VLSE1+PEPC10 AB SER IA-ACNC: 0.41 IV

## 2024-03-25 ENCOUNTER — APPOINTMENT (OUTPATIENT)
Dept: LAB | Facility: MEDICAL CENTER | Age: 49
End: 2024-03-25
Payer: COMMERCIAL

## 2024-03-27 ENCOUNTER — HOSPITAL ENCOUNTER (OUTPATIENT)
Facility: MEDICAL CENTER | Age: 49
End: 2024-03-27
Attending: INTERNAL MEDICINE
Payer: COMMERCIAL

## 2024-03-27 LAB
C DIFF DNA SPEC QL NAA+PROBE: NEGATIVE
C DIFF TOX GENS STL QL NAA+PROBE: NEGATIVE

## 2024-03-27 PROCEDURE — 87328 CRYPTOSPORIDIUM AG IA: CPT

## 2024-03-27 PROCEDURE — 87329 GIARDIA AG IA: CPT

## 2024-03-27 PROCEDURE — 87493 C DIFF AMPLIFIED PROBE: CPT

## 2024-03-27 PROCEDURE — 87046 STOOL CULTR AEROBIC BACT EA: CPT

## 2024-03-27 PROCEDURE — 87899 AGENT NOS ASSAY W/OPTIC: CPT

## 2024-03-27 PROCEDURE — 87045 FECES CULTURE AEROBIC BACT: CPT

## 2024-03-28 LAB
C PARVUM AG STL QL IA.RAPID: NEGATIVE
E COLI SXT1+2 STL IA: NORMAL
G LAMBLIA AG STL QL IA.RAPID: NEGATIVE
SIGNIFICANT IND 70042: NORMAL
SITE SITE: NORMAL
SOURCE SOURCE: NORMAL

## 2024-03-30 LAB
BACTERIA STL CULT: NORMAL
E COLI SXT1+2 STL IA: NORMAL
SIGNIFICANT IND 70042: NORMAL
SITE SITE: NORMAL
SOURCE SOURCE: NORMAL

## 2024-05-29 ENCOUNTER — OFFICE VISIT (OUTPATIENT)
Dept: INTERNAL MEDICINE | Facility: OTHER | Age: 49
End: 2024-05-29
Payer: COMMERCIAL

## 2024-05-29 VITALS
OXYGEN SATURATION: 98 % | HEIGHT: 70 IN | BODY MASS INDEX: 25.62 KG/M2 | TEMPERATURE: 97.8 F | SYSTOLIC BLOOD PRESSURE: 123 MMHG | HEART RATE: 69 BPM | DIASTOLIC BLOOD PRESSURE: 79 MMHG | WEIGHT: 179 LBS

## 2024-05-29 DIAGNOSIS — Z13.228 SCREENING FOR METABOLIC DISORDER: ICD-10-CM

## 2024-05-29 DIAGNOSIS — B35.1 ONYCHOMYCOSIS: ICD-10-CM

## 2024-05-29 DIAGNOSIS — A98.5: ICD-10-CM

## 2024-05-29 DIAGNOSIS — E78.5 DYSLIPIDEMIA: ICD-10-CM

## 2024-05-29 DIAGNOSIS — Z11.59 NEED FOR HEPATITIS C SCREENING TEST: ICD-10-CM

## 2024-05-29 DIAGNOSIS — Z11.4 SCREENING FOR HIV (HUMAN IMMUNODEFICIENCY VIRUS): ICD-10-CM

## 2024-05-29 PROBLEM — M54.6 PAIN IN THORACIC SPINE: Status: ACTIVE | Noted: 2023-04-19

## 2024-05-29 PROBLEM — M54.14 THORACIC RADICULOPATHY: Status: ACTIVE | Noted: 2023-04-19

## 2024-05-29 PROBLEM — Z00.00 ROUTINE PHYSICAL EXAMINATION: Status: RESOLVED | Noted: 2022-10-19 | Resolved: 2024-05-29

## 2024-05-29 PROBLEM — E78.00 PURE HYPERCHOLESTEROLEMIA: Status: RESOLVED | Noted: 2022-10-10 | Resolved: 2024-05-29

## 2024-05-29 RX ORDER — TERBINAFINE HYDROCHLORIDE 250 MG/1
250 TABLET ORAL DAILY
Qty: 30 TABLET | Refills: 0 | Status: SHIPPED | OUTPATIENT
Start: 2024-05-29

## 2024-05-29 RX ORDER — FAMOTIDINE 10 MG/ML
INJECTION, SOLUTION INTRAVENOUS
COMMUNITY

## 2024-05-29 ASSESSMENT — ENCOUNTER SYMPTOMS
MYALGIAS: 0
SORE THROAT: 0
ABDOMINAL PAIN: 0
PALPITATIONS: 0
FEVER: 0
SHORTNESS OF BREATH: 0
NAUSEA: 0
VOMITING: 0
DIZZINESS: 0
COUGH: 0
HEADACHES: 0
BACK PAIN: 0
CHILLS: 0

## 2024-05-29 ASSESSMENT — PATIENT HEALTH QUESTIONNAIRE - PHQ9: CLINICAL INTERPRETATION OF PHQ2 SCORE: 0

## 2024-05-29 ASSESSMENT — FIBROSIS 4 INDEX: FIB4 SCORE: 1.07

## 2024-05-29 NOTE — LETTER
[employer] [facility]  Adria Barton D.O.  [address] [place] [address]  Fax: [phone]   Authorization for Release/Disclosure of   Protected Health Information   Name: FERMÍN PENA : 1975 SSN: [id number]   Address: [address] [place] [address]  [place] [address] Phone:    [phone] (home) [phone] (work)   I authorize the entity listed below to release/disclose the PHI below to:   [facility]/Adria Barton D.O. and Adria Barton D.O.   Provider or Entity Name:     Address   City, State, Zip   Phone:      Fax:     Reason for request: continuity of care   Information to be released:    [  ] LAST COLONOSCOPY,  including any PATH REPORT and follow-up  [  ] LAST FIT/COLOGUARD RESULT [  ] LAST DEXA  [  ] LAST MAMMOGRAM  [  ] LAST PAP  [  ] LAST LABS [  ] RETINA EXAM REPORT  [  ] IMMUNIZATION RECORDS  [  ] Release all info      [  ] Check here and initial the line next to each item to release ALL health information INCLUDING  _____ Care and treatment for drug and / or alcohol abuse  _____ HIV testing, infection status, or AIDS  _____ Genetic Testing    DATES OF SERVICE OR TIME PERIOD TO BE DISCLOSED: _____________  I understand and acknowledge that:  * This Authorization may be revoked at any time by you in writing, except if your health information has already been used or disclosed.  * Your health information that will be used or disclosed as a result of you signing this authorization could be re-disclosed by the recipient. If this occurs, your re-disclosed health information may no longer be protected by State or Federal laws.  * You may refuse to sign this Authorization. Your refusal will not affect your ability to obtain treatment.  * This Authorization becomes effective upon signing and will  on (date) __________.      If no date is indicated, this Authorization will  one (1) year from the signature date.    Name: Ferímn Pena  Signature: Date:   2024     PLEASE FAX  REQUESTED RECORDS BACK TO: [phone]

## 2024-05-29 NOTE — PROGRESS NOTES
New Patient    Fermín Ortiz is a 48 y.o. male who presents today with the following:    CC: Establish care, toenail fungus    HPI:    Patient is here today to establish care with a new primary.  His main complaint is bilateral toenail fungus for the past couple of years.  He reports associated fungal infection in his fingernails that he has noticed recently.  He is still able to trim his nails but does note some difficulty.  No fever or chills.    Past Medical History:   Diagnosis Date    Allergy     Seasonal, cephalosporins    Asthma     Seasonal asthma    Heart attack (HCC) 1990    drug induced MI (meth and cocaine)    Hyperlipidemia     IBD (inflammatory bowel disease)     constipation    Substance abuse ([facility]) 1990    Meth and cocaine - clean since 1990       Past Surgical History:   Procedure Laterality Date    OTHER Bilateral 08/2020    vasectomy reversal    VASECTOMY Bilateral 2013       Family History   Problem Relation Age of Onset    Cancer Mother         breast    Psychiatric Illness Mother         schizophrenia    Diabetes Father     Heart Disease Father     Hypertension Father     Hyperlipidemia Father     Other Sister         ovarian cysts    Drug abuse Brother         OD    Alcohol abuse Paternal Grandmother     Alcohol abuse Paternal Grandfather     Hyperlipidemia Brother     Allergies Brother     No Known Problems Sister        Social History     Socioeconomic History    Marital status:      Spouse name: Sylwia    Number of children: 3    Years of education: Not on file    Highest education level: Some college, no degree   Occupational History    Not on file   Tobacco Use    Smoking status: Never    Smokeless tobacco: Never   Vaping Use    Vaping status: Never Used   Substance and Sexual Activity    Alcohol use: Yes     Alcohol/week: 0.6 - 1.2 oz     Types: 1 - 2 Standard drinks or equivalent per week    Drug use: Yes     Frequency: 0.2 times per week     Types: Marijuana, Oral      Comment: Medical [facility] currently. Meth and cocaine - clean since 1990    Sexual activity: Yes     Partners: Female   Other Topics Concern    Not on file   Social History Narrative    Not on file     Social Determinants of Health     Financial Resource Strain: Not on file   Food Insecurity: Not on file   Transportation Needs: Not on file   Physical Activity: Not on file   Stress: Not on file   Social Connections: Not on file   Intimate Partner Violence: Not on file   Housing Stability: Not on file       Current Outpatient Medications   Medication Sig Dispense Refill    terbinafine (LAMISIL) 250 MG Tab Take 1 Tablet by mouth every day. 30 Tablet 0    acyclovir (ZOVIRAX) 400 MG tablet Take 1 Tablet by mouth 2 times a day. 20 Tablet 3    sucralfate (CARAFATE) 1 GM Tab Take 1 Tablet by mouth 4 Times a Day,Before Meals and at Bedtime. 120 Tablet 3    LINZESS 72 MCG Cap Take 1 Capsule by mouth every day.      pantoprazole (PROTONIX) 40 MG Tablet Delayed Response Take 1 Tablet by mouth every day.      cetirizine (ZYRTEC) 10 MG Tab Take 10 mg by mouth every day.      Famotidine, PF, 20 MG/2ML Solution        No current facility-administered medications for this visit.       Allergies   Allergen Reactions    Cephalosporins Swelling     Took and went into the sun, broke out in rash on face           Review of Systems:  Review of Systems   Constitutional:  Negative for chills and fever.   HENT:  Negative for congestion and sore throat.    Respiratory:  Negative for cough and shortness of breath.    Cardiovascular:  Negative for chest pain and palpitations.   Gastrointestinal:  Negative for abdominal pain, nausea and vomiting.   Genitourinary:  Negative for dysuria and hematuria.   Musculoskeletal:  Negative for back pain and myalgias.   Skin:  Negative for itching and rash.   Neurological:  Negative for dizziness and headaches.        Objective:  Vitals:   /79 (BP Location: Left arm, Patient Position: Sitting, BP  "Cuff Size: Adult)   Pulse 69   Temp 36.6 °C (97.8 °F) (Temporal)   Ht 1.778 m (5' 10\")   Wt 81.2 kg (179 lb)   SpO2 98%  Body mass index is 25.68 kg/m².    Physical Exam  Constitutional:       General: He is not in acute distress.     Appearance: Normal appearance.   HENT:      Head: Normocephalic and atraumatic.      Mouth/Throat:      Mouth: Mucous membranes are moist.      Pharynx: No oropharyngeal exudate.   Eyes:      Extraocular Movements: Extraocular movements intact.      Pupils: Pupils are equal, round, and reactive to light.   Cardiovascular:      Rate and Rhythm: Normal rate and regular rhythm.   Pulmonary:      Effort: No respiratory distress.      Breath sounds: Normal breath sounds. No wheezing.   Abdominal:      General: There is no distension.      Palpations: Abdomen is soft.      Tenderness: There is no abdominal tenderness.   Musculoskeletal:      Cervical back: Neck supple. No rigidity.   Skin:     General: Skin is warm and dry.      Comments: Thickened bilateral toenails with discoloration  Slightly thickened fingernails   Neurological:      General: No focal deficit present.      Mental Status: He is oriented to person, place, and time.            Assessment and Plan:     #Onychomycosis  Fungal infection of his bilateral toenails most notable in his great toes.  Has discoloration and mild thickness to his toenails.  Patient is able to still trim his toenails but has some difficulty.  His fingernails have also been having some slight thickness and discoloration as well.  CMP from March 2024 shows normal LFTs  - Terbinafine 250 mg daily for 3 months with monthly CMP's  -Repeat CMP in 1 month to evaluate liver function  -Advised to keep toenails away from moisture, recommend wearing open toed shoes    #Dyslipidemia  Lipid profile in 2023 notable for LDL of 161  -Repeat lipid profile  - Encouraged healthy diet    #History of hantavirus  #Irritable bowel syndrome  Patient states he had " Hantavirus infection back in 2018 after he ingested rat poop.  Since clearing the infection, he has been experiencing cycles of diarrhea followed by constipation.  He is [facility] and had EGD and colonoscopy done.  He is currently undergoing capsule endoscopy.  - Continue [facility]  - Records requested    #Screening for HIV (human immunodeficiency virus)  #Need for hepatitis C screening test  - HEP C VIRUS ANTIBODY; Future  - HIV AG/AB COMBO ASSAY SCREENING; Future    Follow up: 6 weeks    Signed by: Adria Barton D.O.

## 2024-06-12 ENCOUNTER — OFFICE VISIT (OUTPATIENT)
Dept: INTERNAL MEDICINE | Facility: OTHER | Age: 49
End: 2024-06-12
Payer: COMMERCIAL

## 2024-06-12 VITALS
HEIGHT: 70 IN | SYSTOLIC BLOOD PRESSURE: 115 MMHG | DIASTOLIC BLOOD PRESSURE: 74 MMHG | TEMPERATURE: 97.8 F | OXYGEN SATURATION: 96 % | BODY MASS INDEX: 24.62 KG/M2 | WEIGHT: 172 LBS | HEART RATE: 68 BPM

## 2024-06-12 DIAGNOSIS — K59.00 CONSTIPATION, UNSPECIFIED CONSTIPATION TYPE: ICD-10-CM

## 2024-06-12 DIAGNOSIS — Z11.3 SCREENING EXAMINATION FOR STI: ICD-10-CM

## 2024-06-12 DIAGNOSIS — R53.82 CHRONIC FATIGUE: ICD-10-CM

## 2024-06-12 DIAGNOSIS — I89.0 LYMPHANGIECTASIA: ICD-10-CM

## 2024-06-12 PROCEDURE — 99213 OFFICE O/P EST LOW 20 MIN: CPT | Mod: GE | Performed by: STUDENT IN AN ORGANIZED HEALTH CARE EDUCATION/TRAINING PROGRAM

## 2024-06-12 ASSESSMENT — FIBROSIS 4 INDEX: FIB4 SCORE: 1.07

## 2024-06-12 NOTE — PATIENT INSTRUCTIONS
Thank you for visiting today!  Please follow-up in As scheduled with Dr. Barton in approx 4 weeks   We will perform some more blood tests including a smear as well as stool tests x3 if there are parasites the stool test should pick them up.   Please follow up with GI as scheduled to get their opinion.   Please get lab work done at least 5 days before next visit.  Please try and eat healthy, get at least 30 minutes of cardiovascular exercise a day to help keep your health as best as it can be.  If you have any questions or concerns please feel free to contact us at [phone].  If you feel like you are experiencing a medical emergency please seek immediate medical attention at an urgent care or in the emergency department.

## 2024-06-12 NOTE — PROGRESS NOTES
Established Patient    Patient Care Team:  Adria Barton D.O. as PCP - General (Internal Medicine)    Fermín Ortiz is a 48 y.o. male who presents today with the following Chief Complaint(s): Follow up for Diagnoses of Constipation, unspecified constipation type, Chronic fatigue, Lymphangiectasia, and Screening examination for STI were pertinent to this visit.    HPI:  Mr. Ortiz is a very pleasant 48-year-old male with past medical history significant for DDD, previous hantavirus infection, chronic fatigue, Tom's esophagus/GE, seasonal allergies who presents today to discuss chronic constipation and fatigue.  Patient's constellation of symptoms include periodic symptoms of fatigue and constipation for which patient wonders if his recent capsule endoscopy findings of a single benign-appearing lymphectasia to be contributing.  Patient states that for several years now he has had an issue with chronic fatigue, feels skeletal pain and constipation that flares up every 3 to 4 weeks that he finds debilitating.  Is been a significant concern for the patient who has undergone extensive GI workup for constipation including upper and lower scopes and more recently a pill endoscopy.  Patient is wondering if that lymph ectasia could be related to lymphatic parasite infection patient with no [place]  exposures, though he did one-time have a work mate that had gone to that region.  Patient with some exposures in the developing [place] as, no major lower extremity edema, just is hard constipated stool that are [place] school 5 or 6 that it.  This significant abdominal pain without stool caliber changes.  Patient is still following with GI but would like workup for lymphatic parasite infection, which he thinks may be causing his symptoms he is read in several papers these symptoms are sometimes worse during the nighttime, also thinks that doxycycline may have been able to kill the adult worms when he is  taking it for chronic prostatitis months ago but not able to stopped the infection overall.  14 point review of systems negative for as above below.    ROS:     General: No fevers, chills, night sweats, weight loss or gain  HEENT: No hearing changes, vision changes, eye pain, ear pain, nasal discharge, sore throat  Neck: No swelling in neck  Pulmonary: No shortness of breath, cough, sputum, or hemoptysis  Cardiovascular: No chest pain, palpitations, or LE swelling  GI: No nausea, vomiting, diarrhea,  hematochezia or melena  : No dysuria or frequency  Neuro: No focal weakness, no general weakness, no headaches, no lightheadedness, no dizziness  Psych: No anxiety or depression    Past Medical History:   Diagnosis Date    Allergy     Seasonal, cephalosporins    Asthma     Seasonal asthma    Hantavirus infection     Heart attack (HCC) 1990    drug induced MI (meth and cocaine)    Hyperlipidemia     IBD (inflammatory bowel disease)     constipation    Substance abuse (HCC) 1990    Meth and cocaine - clean since 1990     Social History     Tobacco Use    Smoking status: Never    Smokeless tobacco: Never   Vaping Use    Vaping status: Never Used   Substance Use Topics    Alcohol use: Yes     Alcohol/week: 0.6 - 1.2 oz     Types: 1 - 2 Standard drinks or equivalent per week    Drug use: Yes     Frequency: 0.2 times per week     Types: Marijuana, Oral     Comment: Medical [facility] currently. Meth and cocaine - clean since 1990     Current Outpatient Medications   Medication Sig Dispense Refill    Famotidine, PF, 20 MG/2ML Solution       terbinafine (LAMISIL) 250 MG Tab Take 1 Tablet by mouth every day. 30 Tablet 0    acyclovir (ZOVIRAX) 400 MG tablet Take 1 Tablet by mouth 2 times a day. 20 Tablet 3    sucralfate (CARAFATE) 1 GM Tab Take 1 Tablet by mouth 4 Times a Day,Before Meals and at Bedtime. 120 Tablet 3    LINZESS 72 MCG Cap Take 1 Capsule by mouth every day.      pantoprazole (PROTONIX) 40 MG Tablet Delayed  "Response Take 1 Tablet by mouth every day.      cetirizine (ZYRTEC) 10 MG Tab Take 10 mg by mouth every day.       No current facility-administered medications for this visit.       Physical Exam:  /74 (BP Location: Left arm, Patient Position: Sitting, BP Cuff Size: Adult)   Pulse 68   Temp 36.6 °C (97.8 °F) (Temporal)   Ht 1.778 m (5' 10\")   Wt 78 kg (172 lb)   SpO2 96%   BMI 24.68 kg/m²   General: Well developed, well nourished male, in no distress.  HEENT: NC/AT, PERRL, EOMI, no scleral icterus or conjunctival pallor, fair dentition, no nasal discharge or oral erythema or exudates.   Neck: Supple, No cervical or supraclavicular LAD  CV:RRR, no murmurs gallops or rubs, no JVD  Pulm: LCAB, no crackles, rales, rhonchi, or wheezing  GI: Normal bowel sounds, abdomen soft, nontender, nondistended to deep or light palpation in all 4 quadrants, no HSM.  MSK: Radial and dorsalis pedis pulses 2+ and equal bilaterally, respectively.  Strength 5 out of 5 in upper and lower extremities.  No lower extremity edema  Neuro: Patient is alert and oriented x3, no focal deficits  Psych: Appropriate mood and affect       Assessment and Plan:   1. Constipation, unspecified constipation type  2. Chronic fatigue  3. 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  Reviewed previous workup, negative inflammatory and GI workup, negative metabolic workup.  Did have history of hantavirus possible postviral symptoms?  Discussed case with Dr. Bedolla who recommend complete O&P as below, will also rule out syphilis.  - Complete O&P; Standing  - CBC " WITH DIFFERENTIAL; Future  - rpr      Follow-up with PCP Adria Barton next 4 to 6 weeks.    Patient Instructions   Thank you for visiting today!  Please follow-up in As scheduled with Dr. Barton in approx 4 weeks   We will perform some more blood tests including a smear as well as stool tests x3 if there are parasites the stool test should pick them up.   Please follow up with GI as scheduled to get their opinion.   Please get lab work done at least 5 days before next visit.  Please try and eat healthy, get at least 30 minutes of cardiovascular exercise a day to help keep your health as best as it can be.  If you have any questions or concerns please feel free to contact us at [phone].  If you feel like you are experiencing a medical emergency please seek immediate medical attention at an urgent care or in the emergency department.       Xi Comer M.D. PGY 3  [facility]    This note was created using voice recognition software.  While every attempt is made to ensure accuracy of transcription, occasionally errors occur.

## 2024-06-13 ENCOUNTER — PATIENT MESSAGE (OUTPATIENT)
Dept: INTERNAL MEDICINE | Facility: OTHER | Age: 49
End: 2024-06-13

## 2024-06-13 ENCOUNTER — HOSPITAL ENCOUNTER (OUTPATIENT)
Dept: LAB | Facility: MEDICAL CENTER | Age: 49
End: 2024-06-13
Attending: STUDENT IN AN ORGANIZED HEALTH CARE EDUCATION/TRAINING PROGRAM
Payer: COMMERCIAL

## 2024-06-13 DIAGNOSIS — I89.0 LYMPHANGIECTASIA: ICD-10-CM

## 2024-06-13 DIAGNOSIS — Z11.3 SCREENING EXAMINATION FOR STI: ICD-10-CM

## 2024-06-13 DIAGNOSIS — R53.82 CHRONIC FATIGUE: ICD-10-CM

## 2024-06-13 DIAGNOSIS — K59.00 CONSTIPATION, UNSPECIFIED CONSTIPATION TYPE: ICD-10-CM

## 2024-06-13 LAB
BASOPHILS # BLD AUTO: 0.6 % (ref 0–1.8)
BASOPHILS # BLD: 0.03 K/UL (ref 0–0.12)
EOSINOPHIL # BLD AUTO: 0.16 K/UL (ref 0–0.51)
EOSINOPHIL NFR BLD: 3.1 % (ref 0–6.9)
ERYTHROCYTE [DISTWIDTH] IN BLOOD BY AUTOMATED COUNT: 42.4 FL (ref 35.9–50)
HCT VFR BLD AUTO: 41.3 % (ref 42–52)
HGB BLD-MCNC: 14.7 G/DL (ref 14–18)
IMM GRANULOCYTES # BLD AUTO: 0.01 K/UL (ref 0–0.11)
IMM GRANULOCYTES NFR BLD AUTO: 0.2 % (ref 0–0.9)
LYMPHOCYTES # BLD AUTO: 2.16 K/UL (ref 1–4.8)
LYMPHOCYTES NFR BLD: 41.8 % (ref 22–41)
MCH RBC QN AUTO: 34 PG (ref 27–33)
MCHC RBC AUTO-ENTMCNC: 35.6 G/DL (ref 32.3–36.5)
MCV RBC AUTO: 95.6 FL (ref 81.4–97.8)
MONOCYTES # BLD AUTO: 0.43 K/UL (ref 0–0.85)
MONOCYTES NFR BLD AUTO: 8.3 % (ref 0–13.4)
NEUTROPHILS # BLD AUTO: 2.38 K/UL (ref 1.82–7.42)
NEUTROPHILS NFR BLD: 46 % (ref 44–72)
NRBC # BLD AUTO: 0 K/UL
NRBC BLD-RTO: 0 /100 WBC (ref 0–0.2)
PLATELET # BLD AUTO: 227 K/UL (ref 164–446)
PMV BLD AUTO: 9.8 FL (ref 9–12.9)
RBC # BLD AUTO: 4.32 M/UL (ref 4.7–6.1)
T PALLIDUM AB SER QL IA: NORMAL
WBC # BLD AUTO: 5.2 K/UL (ref 4.8–10.8)

## 2024-06-13 PROCEDURE — 86780 TREPONEMA PALLIDUM: CPT

## 2024-06-13 PROCEDURE — 85025 COMPLETE CBC W/AUTO DIFF WBC: CPT

## 2024-06-13 PROCEDURE — 36415 COLL VENOUS BLD VENIPUNCTURE: CPT

## 2024-06-14 ENCOUNTER — HOSPITAL ENCOUNTER (OUTPATIENT)
Facility: MEDICAL CENTER | Age: 49
End: 2024-06-14
Attending: STUDENT IN AN ORGANIZED HEALTH CARE EDUCATION/TRAINING PROGRAM
Payer: COMMERCIAL

## 2024-06-14 DIAGNOSIS — I89.0 LYMPHANGIECTASIA: ICD-10-CM

## 2024-06-14 PROCEDURE — 87177 OVA AND PARASITES SMEARS: CPT

## 2024-06-14 PROCEDURE — 87209 SMEAR COMPLEX STAIN: CPT

## 2024-06-19 LAB — OVA AND PARASITE, FECAL INTERPRETATION Q0595: NEGATIVE

## 2024-06-21 ENCOUNTER — HOSPITAL ENCOUNTER (OUTPATIENT)
Dept: LAB | Facility: MEDICAL CENTER | Age: 49
End: 2024-06-21
Attending: NURSE PRACTITIONER
Payer: COMMERCIAL

## 2024-06-21 PROCEDURE — 36415 COLL VENOUS BLD VENIPUNCTURE: CPT

## 2024-06-21 PROCEDURE — 82787 IGG 1 2 3 OR 4 EACH: CPT

## 2024-06-23 LAB — IGG4 SER-MCNC: 132 MG/DL (ref 1–123)

## 2024-06-24 ENCOUNTER — HOSPITAL ENCOUNTER (OUTPATIENT)
Facility: MEDICAL CENTER | Age: 49
End: 2024-06-24
Attending: STUDENT IN AN ORGANIZED HEALTH CARE EDUCATION/TRAINING PROGRAM
Payer: COMMERCIAL

## 2024-06-24 ENCOUNTER — TELEPHONE (OUTPATIENT)
Dept: INTERNAL MEDICINE | Facility: OTHER | Age: 49
End: 2024-06-24
Payer: COMMERCIAL

## 2024-06-24 DIAGNOSIS — I89.0 LYMPHANGIECTASIA: ICD-10-CM

## 2024-06-24 LAB
C PARVUM AG STL QL IA.RAPID: NEGATIVE
G LAMBLIA AG STL QL IA.RAPID: NEGATIVE

## 2024-06-24 PROCEDURE — 87329 GIARDIA AG IA: CPT

## 2024-06-24 PROCEDURE — 87328 CRYPTOSPORIDIUM AG IA: CPT

## 2024-06-24 NOTE — TELEPHONE ENCOUNTER
[facility] [employer] in [place] called requesting more instructions regarding a stool sample. They explained the the instructions weren't very clear. They left their number to call tomorrow if they can get clarification on the order. Thank you. [phone]

## 2024-06-27 ENCOUNTER — HOSPITAL ENCOUNTER (OUTPATIENT)
Facility: MEDICAL CENTER | Age: 49
End: 2024-06-27
Attending: STUDENT IN AN ORGANIZED HEALTH CARE EDUCATION/TRAINING PROGRAM
Payer: COMMERCIAL

## 2024-06-27 PROCEDURE — 87329 GIARDIA AG IA: CPT

## 2024-06-27 PROCEDURE — 87328 CRYPTOSPORIDIUM AG IA: CPT

## 2024-06-28 ENCOUNTER — OFFICE VISIT (OUTPATIENT)
Dept: URGENT CARE | Facility: PHYSICIAN GROUP | Age: 49
End: 2024-06-28
Payer: COMMERCIAL

## 2024-06-28 VITALS
SYSTOLIC BLOOD PRESSURE: 120 MMHG | RESPIRATION RATE: 16 BRPM | TEMPERATURE: 98.1 F | HEIGHT: 70 IN | BODY MASS INDEX: 24.62 KG/M2 | DIASTOLIC BLOOD PRESSURE: 74 MMHG | OXYGEN SATURATION: 96 % | WEIGHT: 172 LBS | HEART RATE: 76 BPM

## 2024-06-28 DIAGNOSIS — K59.09 CHRONIC CONSTIPATION: ICD-10-CM

## 2024-06-28 DIAGNOSIS — I89.0 LYMPHANGIECTASIA: ICD-10-CM

## 2024-06-28 LAB
C PARVUM AG STL QL IA.RAPID: NEGATIVE
G LAMBLIA AG STL QL IA.RAPID: NEGATIVE

## 2024-06-28 RX ORDER — LINACLOTIDE 72 UG/1
1 CAPSULE, GELATIN COATED ORAL DAILY
Qty: 30 CAPSULE | Refills: 0 | Status: SHIPPED | OUTPATIENT
Start: 2024-06-28

## 2024-06-28 ASSESSMENT — FIBROSIS 4 INDEX: FIB4 SCORE: 1.35

## 2024-06-28 NOTE — PROGRESS NOTES
Subjective     Fermín Ortiz is a 48 y.o. male who presents with Constipation (X 2 months with lower GI pain.  He is asking for lab work.)            HPI  New problem.  Patient is a 48-year-old male who presents with request for testing for filaria IgG.  He apparently has been followed by gastroenterology as well as his primary care for persistent GI symptoms.  He was tested for an IgG subfour that came back positive and is requesting this additional lab work.  His primary care doctor last saw him about a month ago for GI symptoms he has had numerous lab as well as upper and lower endoscopy done to rule out parasites, and Giardia, etc.  I have advised him that in this clinic he really needs to reach out to his GI doctor or his primary care for further follow-up and specialized testing for this.  He told me that his GI wants him to be seen in a tertiary center which I explained to the patient is not urgent care.  A tertiary center would be either [facility] or [facility] [place].  Additionally he is requesting a refill of his Linzess for his chronic constipation.    Cephalosporins  Current Outpatient Medications on File Prior to Visit   Medication Sig Dispense Refill    Famotidine, PF, 20 MG/2ML Solution       terbinafine (LAMISIL) 250 MG Tab Take 1 Tablet by mouth every day. 30 Tablet 0    acyclovir (ZOVIRAX) 400 MG tablet Take 1 Tablet by mouth 2 times a day. 20 Tablet 3    sucralfate (CARAFATE) 1 GM Tab Take 1 Tablet by mouth 4 Times a Day,Before Meals and at Bedtime. 120 Tablet 3    pantoprazole (PROTONIX) 40 MG Tablet Delayed Response Take 1 Tablet by mouth every day.      cetirizine (ZYRTEC) 10 MG Tab Take 10 mg by mouth every day.       No current facility-administered medications on file prior to visit.     Social History     Socioeconomic History    Marital status:      Spouse name: Sylwia    Number of children: 3    Years of education: Not on file    Highest education level: Some college, no degree  "  Occupational History    Not on file   Tobacco Use    Smoking status: Never    Smokeless tobacco: Never   Vaping Use    Vaping status: Never Used   Substance and Sexual Activity    Alcohol use: Yes     Alcohol/week: 0.6 - 1.2 oz     Types: 1 - 2 Standard drinks or equivalent per week    Drug use: Yes     Frequency: 0.2 times per week     Types: Marijuana, Oral     Comment: Medical [facility] currently. Meth and cocaine - clean since 1990    Sexual activity: Yes     Partners: Female   Other Topics Concern    Not on file   Social History Narrative    Not on file     Social Determinants of Health     Financial Resource Strain: Not on file   Food Insecurity: Not on file   Transportation Needs: Not on file   Physical Activity: Not on file   Stress: Not on file   Social Connections: Not on file   Intimate Partner Violence: Not on file   Housing Stability: Not on file     Breast Cancer-related family history is not on file.      ROS           Objective     /74   Pulse 76   Temp 36.7 °C (98.1 °F) (Temporal)   Resp 16   Ht 1.778 m (5' 10\")   Wt 78 kg (172 lb)   SpO2 96%   BMI 24.68 kg/m²      Physical Exam  Constitutional:       Appearance: Normal appearance. He is not ill-appearing.   Musculoskeletal:         General: Normal range of motion.   Skin:     General: Skin is warm and dry.   Neurological:      General: No focal deficit present.      Mental Status: He is alert and oriented to person, place, and time.                             Assessment & Plan        1. Chronic constipation  LINZESS 72 MCG Cap        Reviewed with patient that his request will need to go through PCP or GI. If GI is unwilling to do this patient should request referral to tertiary center for ongoing issues. This is simply too specialized for urgent care to be involved in.             "

## 2024-07-07 LAB — OVA AND PARASITE, FECAL INTERPRETATION Q0595: NEGATIVE

## 2024-07-08 ENCOUNTER — HOSPITAL ENCOUNTER (OUTPATIENT)
Dept: LAB | Facility: MEDICAL CENTER | Age: 49
End: 2024-07-08
Payer: COMMERCIAL

## 2024-07-08 DIAGNOSIS — Z13.228 SCREENING FOR METABOLIC DISORDER: ICD-10-CM

## 2024-07-08 DIAGNOSIS — B35.1 ONYCHOMYCOSIS: ICD-10-CM

## 2024-07-08 LAB
ALBUMIN SERPL BCP-MCNC: 4.6 G/DL (ref 3.2–4.9)
ALBUMIN/GLOB SERPL: 1.9 G/DL
ALP SERPL-CCNC: 54 U/L (ref 30–99)
ALT SERPL-CCNC: 13 U/L (ref 2–50)
ANION GAP SERPL CALC-SCNC: 13 MMOL/L (ref 7–16)
AST SERPL-CCNC: 17 U/L (ref 12–45)
BILIRUB SERPL-MCNC: 0.7 MG/DL (ref 0.1–1.5)
BUN SERPL-MCNC: 17 MG/DL (ref 8–22)
CALCIUM ALBUM COR SERPL-MCNC: 9 MG/DL (ref 8.5–10.5)
CALCIUM SERPL-MCNC: 9.5 MG/DL (ref 8.5–10.5)
CHLORIDE SERPL-SCNC: 103 MMOL/L (ref 96–112)
CO2 SERPL-SCNC: 23 MMOL/L (ref 20–33)
CREAT SERPL-MCNC: 0.78 MG/DL (ref 0.5–1.4)
FASTING STATUS PATIENT QL REPORTED: NORMAL
GFR SERPLBLD CREATININE-BSD FMLA CKD-EPI: 110 ML/MIN/1.73 M 2
GLOBULIN SER CALC-MCNC: 2.4 G/DL (ref 1.9–3.5)
GLUCOSE SERPL-MCNC: 117 MG/DL (ref 65–99)
POTASSIUM SERPL-SCNC: 4.4 MMOL/L (ref 3.6–5.5)
PROT SERPL-MCNC: 7 G/DL (ref 6–8.2)
SODIUM SERPL-SCNC: 139 MMOL/L (ref 135–145)

## 2024-07-08 PROCEDURE — 36415 COLL VENOUS BLD VENIPUNCTURE: CPT

## 2024-07-08 PROCEDURE — 80053 COMPREHEN METABOLIC PANEL: CPT

## 2024-07-09 ENCOUNTER — HOSPITAL ENCOUNTER (EMERGENCY)
Facility: MEDICAL CENTER | Age: 49
End: 2024-07-09
Payer: COMMERCIAL

## 2024-07-10 ENCOUNTER — APPOINTMENT (OUTPATIENT)
Dept: INTERNAL MEDICINE | Facility: OTHER | Age: 49
End: 2024-07-10
Payer: COMMERCIAL

## 2024-07-12 ENCOUNTER — TELEPHONE (OUTPATIENT)
Dept: INTERNAL MEDICINE | Facility: OTHER | Age: 49
End: 2024-07-12

## 2024-07-12 ENCOUNTER — TELEMEDICINE (OUTPATIENT)
Dept: INTERNAL MEDICINE | Facility: OTHER | Age: 49
End: 2024-07-12
Payer: COMMERCIAL

## 2024-07-12 VITALS
HEART RATE: 59 BPM | SYSTOLIC BLOOD PRESSURE: 138 MMHG | DIASTOLIC BLOOD PRESSURE: 79 MMHG | OXYGEN SATURATION: 96 % | HEIGHT: 70 IN | BODY MASS INDEX: 24.34 KG/M2 | WEIGHT: 170 LBS

## 2024-07-12 DIAGNOSIS — B35.1 ONYCHOMYCOSIS: ICD-10-CM

## 2024-07-12 DIAGNOSIS — R53.82 CHRONIC FATIGUE: ICD-10-CM

## 2024-07-12 DIAGNOSIS — K58.1 IRRITABLE BOWEL SYNDROME WITH CONSTIPATION: ICD-10-CM

## 2024-07-12 PROCEDURE — 99214 OFFICE O/P EST MOD 30 MIN: CPT | Mod: GC

## 2024-07-12 RX ORDER — TERBINAFINE HYDROCHLORIDE 250 MG/1
250 TABLET ORAL DAILY
Qty: 30 TABLET | Refills: 0 | Status: SHIPPED | OUTPATIENT
Start: 2024-07-12

## 2024-07-12 ASSESSMENT — ENCOUNTER SYMPTOMS
DIARRHEA: 0
WHEEZING: 0
NAUSEA: 0
ABDOMINAL PAIN: 0
CONSTIPATION: 1
FEVER: 0
CLAUDICATION: 0
COUGH: 0
HEMOPTYSIS: 0
PALPITATIONS: 0
CHILLS: 0
VOMITING: 0
HEARTBURN: 0
SHORTNESS OF BREATH: 0
WEIGHT LOSS: 0

## 2024-07-12 ASSESSMENT — FIBROSIS 4 INDEX: FIB4 SCORE: 1

## 2024-07-19 ENCOUNTER — HOSPITAL ENCOUNTER (OUTPATIENT)
Facility: MEDICAL CENTER | Age: 49
End: 2024-07-19
Attending: NURSE PRACTITIONER
Payer: COMMERCIAL

## 2024-07-19 DIAGNOSIS — K59.09 CHRONIC CONSTIPATION: ICD-10-CM

## 2024-07-30 ENCOUNTER — HOSPITAL ENCOUNTER (OUTPATIENT)
Facility: MEDICAL CENTER | Age: 49
End: 2024-07-30
Attending: NURSE PRACTITIONER
Payer: COMMERCIAL

## 2024-07-30 PROCEDURE — 87207 SMEAR SPECIAL STAIN: CPT

## 2024-07-31 LAB
MALARIA SMEAR BLD: NORMAL
SIGNIFICANT IND 70042: NORMAL
SITE SITE: NORMAL
SOURCE SOURCE: NORMAL

## 2024-08-28 DIAGNOSIS — B35.1 ONYCHOMYCOSIS: ICD-10-CM

## 2024-10-02 ENCOUNTER — HOSPITAL ENCOUNTER (OUTPATIENT)
Dept: LAB | Facility: MEDICAL CENTER | Age: 49
End: 2024-10-02
Payer: COMMERCIAL

## 2024-10-02 DIAGNOSIS — E78.5 DYSLIPIDEMIA: ICD-10-CM

## 2024-10-02 DIAGNOSIS — B35.1 ONYCHOMYCOSIS: ICD-10-CM

## 2024-10-02 DIAGNOSIS — Z11.4 SCREENING FOR HIV (HUMAN IMMUNODEFICIENCY VIRUS): ICD-10-CM

## 2024-10-02 DIAGNOSIS — Z11.59 NEED FOR HEPATITIS C SCREENING TEST: ICD-10-CM

## 2024-10-02 PROCEDURE — 80061 LIPID PANEL: CPT

## 2024-10-02 PROCEDURE — 36415 COLL VENOUS BLD VENIPUNCTURE: CPT

## 2024-10-02 PROCEDURE — 80048 BASIC METABOLIC PNL TOTAL CA: CPT

## 2024-10-03 LAB
ANION GAP SERPL CALC-SCNC: 13 MMOL/L (ref 7–16)
BUN SERPL-MCNC: 16 MG/DL (ref 8–22)
CALCIUM SERPL-MCNC: 9.2 MG/DL (ref 8.5–10.5)
CHLORIDE SERPL-SCNC: 104 MMOL/L (ref 96–112)
CHOLEST SERPL-MCNC: 220 MG/DL (ref 100–199)
CO2 SERPL-SCNC: 23 MMOL/L (ref 20–33)
CREAT SERPL-MCNC: 0.86 MG/DL (ref 0.5–1.4)
GFR SERPLBLD CREATININE-BSD FMLA CKD-EPI: 106 ML/MIN/1.73 M 2
GLUCOSE SERPL-MCNC: 90 MG/DL (ref 65–99)
HDLC SERPL-MCNC: 43 MG/DL
LDLC SERPL CALC-MCNC: 162 MG/DL
POTASSIUM SERPL-SCNC: 4.6 MMOL/L (ref 3.6–5.5)
SODIUM SERPL-SCNC: 140 MMOL/L (ref 135–145)
TRIGL SERPL-MCNC: 73 MG/DL (ref 0–149)

## 2024-10-04 DIAGNOSIS — B35.1 ONYCHOMYCOSIS: ICD-10-CM

## 2024-10-07 RX ORDER — TERBINAFINE HYDROCHLORIDE 250 MG/1
250 TABLET ORAL DAILY
Qty: 30 TABLET | Refills: 0 | Status: SHIPPED | OUTPATIENT
Start: 2024-10-07

## 2024-11-05 ENCOUNTER — HOSPITAL ENCOUNTER (OUTPATIENT)
Dept: LAB | Facility: MEDICAL CENTER | Age: 49
End: 2024-11-05
Payer: COMMERCIAL

## 2024-11-05 DIAGNOSIS — B35.1 ONYCHOMYCOSIS: ICD-10-CM

## 2024-11-05 PROCEDURE — 80053 COMPREHEN METABOLIC PANEL: CPT

## 2024-11-05 PROCEDURE — 36415 COLL VENOUS BLD VENIPUNCTURE: CPT

## 2024-11-06 LAB
ALBUMIN SERPL BCP-MCNC: 4.6 G/DL (ref 3.2–4.9)
ALBUMIN/GLOB SERPL: 1.5 G/DL
ALP SERPL-CCNC: 54 U/L (ref 30–99)
ALT SERPL-CCNC: 15 U/L (ref 2–50)
ANION GAP SERPL CALC-SCNC: 11 MMOL/L (ref 7–16)
AST SERPL-CCNC: 23 U/L (ref 12–45)
BILIRUB SERPL-MCNC: 0.8 MG/DL (ref 0.1–1.5)
BUN SERPL-MCNC: 18 MG/DL (ref 8–22)
CALCIUM ALBUM COR SERPL-MCNC: 9.4 MG/DL (ref 8.5–10.5)
CALCIUM SERPL-MCNC: 9.9 MG/DL (ref 8.5–10.5)
CHLORIDE SERPL-SCNC: 101 MMOL/L (ref 96–112)
CO2 SERPL-SCNC: 27 MMOL/L (ref 20–33)
CREAT SERPL-MCNC: 0.77 MG/DL (ref 0.5–1.4)
GFR SERPLBLD CREATININE-BSD FMLA CKD-EPI: 110 ML/MIN/1.73 M 2
GLOBULIN SER CALC-MCNC: 3.1 G/DL (ref 1.9–3.5)
GLUCOSE SERPL-MCNC: 95 MG/DL (ref 65–99)
POTASSIUM SERPL-SCNC: 4.4 MMOL/L (ref 3.6–5.5)
PROT SERPL-MCNC: 7.7 G/DL (ref 6–8.2)
SODIUM SERPL-SCNC: 139 MMOL/L (ref 135–145)

## 2024-11-11 ENCOUNTER — APPOINTMENT (OUTPATIENT)
Dept: INTERNAL MEDICINE | Facility: OTHER | Age: 49
End: 2024-11-11
Payer: COMMERCIAL

## 2024-11-21 ENCOUNTER — HOSPITAL ENCOUNTER (OUTPATIENT)
Dept: RADIOLOGY | Facility: MEDICAL CENTER | Age: 49
End: 2024-11-21
Payer: COMMERCIAL

## 2024-11-21 DIAGNOSIS — R10.13 DYSPEPSIA: ICD-10-CM

## 2024-11-21 PROCEDURE — 76700 US EXAM ABDOM COMPLETE: CPT

## 2024-12-16 ENCOUNTER — APPOINTMENT (OUTPATIENT)
Dept: INTERNAL MEDICINE | Facility: OTHER | Age: 49
End: 2024-12-16
Payer: COMMERCIAL

## 2024-12-16 VITALS
HEIGHT: 70 IN | HEART RATE: 70 BPM | WEIGHT: 182.6 LBS | DIASTOLIC BLOOD PRESSURE: 68 MMHG | OXYGEN SATURATION: 98 % | TEMPERATURE: 98.7 F | BODY MASS INDEX: 26.14 KG/M2 | SYSTOLIC BLOOD PRESSURE: 126 MMHG

## 2024-12-16 DIAGNOSIS — R53.82 CHRONIC FATIGUE: ICD-10-CM

## 2024-12-16 DIAGNOSIS — K59.00 CONSTIPATION, UNSPECIFIED CONSTIPATION TYPE: ICD-10-CM

## 2024-12-16 DIAGNOSIS — I89.0 LYMPHANGIECTASIA: ICD-10-CM

## 2024-12-16 DIAGNOSIS — K58.1 IRRITABLE BOWEL SYNDROME WITH CONSTIPATION: ICD-10-CM

## 2024-12-16 DIAGNOSIS — R10.13 EPIGASTRIC ABDOMINAL PAIN: ICD-10-CM

## 2024-12-16 DIAGNOSIS — R10.9 ABDOMINAL PAIN, UNSPECIFIED ABDOMINAL LOCATION: ICD-10-CM

## 2024-12-16 PROCEDURE — 3078F DIAST BP <80 MM HG: CPT | Mod: GC

## 2024-12-16 PROCEDURE — 3074F SYST BP LT 130 MM HG: CPT | Mod: GC

## 2024-12-16 PROCEDURE — 99214 OFFICE O/P EST MOD 30 MIN: CPT | Mod: GC

## 2024-12-16 ASSESSMENT — ENCOUNTER SYMPTOMS
COUGH: 0
BACK PAIN: 0
VOMITING: 0
ABDOMINAL PAIN: 1
CONSTIPATION: 1
MYALGIAS: 0
PALPITATIONS: 0
SHORTNESS OF BREATH: 0
NAUSEA: 0
DIZZINESS: 0
CHILLS: 0
SORE THROAT: 0
FEVER: 0
HEADACHES: 0

## 2024-12-16 ASSESSMENT — FIBROSIS 4 INDEX: FIB4 SCORE: 1.28

## 2024-12-16 NOTE — LETTER
[facility]  Adria Barton D.O.  [address] [place] [address]  Fax: [phone]   Authorization for Release/Disclosure of   Protected Health Information   Name: FERMÍN ORTIZ : 1975 SSN: [id number]   Address: [address] [place] [address]  [place] [address] Phone:    [phone] (home) [phone] (work)   I authorize the entity listed below to release/disclose the PHI below to:   [facility]/Adria Barton D.O. and Adria Barton D.O.   Provider or Entity Name:  Dr. Terry   Address   City, [facility]   Phone:      Fax:     Reason for request: continuity of care   Information to be released:    [  ] LAST COLONOSCOPY,  including any PATH REPORT and follow-up  [  ] LAST FIT/COLOGUARD RESULT [  ] LAST DEXA  [  ] LAST MAMMOGRAM  [  ] LAST PAP  [  ] LAST LABS [  ] RETINA EXAM REPORT  [  ] IMMUNIZATION RECORDS  [  ] Release all info      [  ] Check here and initial the line next to each item to release ALL health information INCLUDING  _____ Care and treatment for drug and / or alcohol abuse  _____ HIV testing, infection status, or AIDS  _____ Genetic Testing    DATES OF SERVICE OR TIME PERIOD TO BE DISCLOSED: _____________  I understand and acknowledge that:  * This Authorization may be revoked at any time by you in writing, except if your health information has already been used or disclosed.  * Your health information that will be used or disclosed as a result of you signing this authorization could be re-disclosed by the recipient. If this occurs, your re-disclosed health information may no longer be protected by State or Federal laws.  * You may refuse to sign this Authorization. Your refusal will not affect your ability to obtain treatment.  * This Authorization becomes effective upon signing and will  on (date) __________.      If no date is indicated, this Authorization will  one (1) year from the signature date.    Name: Fermín Ortiz  Signature: Date:   2024     PLEASE  FAX REQUESTED RECORDS BACK TO: [phone]

## 2024-12-16 NOTE — PROGRESS NOTES
Chief Complaint: Follow-up for GI symptoms    Last Seen: 6/2024    History of Present Illness:   Fermín Ortiz is a 49 y.o. male who presents with GI symptoms ever since having Hantavirus in 2018.  He states that he has been having symptoms of left-sided abdominal pain with stringy stools.  He believes that he may have parasites in his body.  Patient has been following with GI consultants and had a pill endoscopy that showed benign-appearing lymphectasia.  They had referred him to higher level of care with [facility] who ordered testing.      Past Medical History:   Diagnosis Date    Allergy     Seasonal, cephalosporins    Asthma     Seasonal asthma    Hantavirus infection     Heart attack (HCC) 1990    drug induced MI (meth and cocaine)    Hyperlipidemia     IBD (inflammatory bowel disease)     constipation    Substance abuse (HCC) 1990    Meth and cocaine - clean since 1990       Past Surgical History:   Procedure Laterality Date    OTHER Bilateral 08/2020    vasectomy reversal    VASECTOMY Bilateral 2013       Family History   Problem Relation Age of Onset    Cancer Mother         breast    Psychiatric Illness Mother         schizophrenia    Diabetes Father     Heart Disease Father     Hypertension Father     Hyperlipidemia Father     Other Sister         ovarian cysts    Drug abuse Brother         OD    Alcohol abuse Paternal Grandmother     Alcohol abuse Paternal Grandfather     Hyperlipidemia Brother     Allergies Brother     No Known Problems Sister        Social History     Socioeconomic History    Marital status:      Spouse name: Sylwia    Number of children: 3    Years of education: Not on file    Highest education level: Some college, no degree   Occupational History    Not on file   Tobacco Use    Smoking status: Never    Smokeless tobacco: Never   Vaping Use    Vaping status: Never Used   Substance and Sexual Activity    Alcohol use: Yes     Alcohol/week: 0.6 - 1.2 oz     Types: 1  - 2 Standard drinks or equivalent per week     Comment: occ.    Drug use: Yes     Frequency: 0.2 times per week     Types: Marijuana, Oral     Comment: Medical marijuana currently. Meth and cocaine - clean since 1990    Sexual activity: Yes     Partners: Female   Other Topics Concern    Not on file   Social History Narrative    Not on file     Social Drivers of Health     Financial Resource Strain: Not on file   Food Insecurity: Not on file   Transportation Needs: Not on file   Physical Activity: Not on file   Stress: Not on file   Social Connections: Not on file   Intimate Partner Violence: Not on file   Housing Stability: Not on file       Current Outpatient Medications   Medication Sig Dispense Refill    Famotidine, PF, 20 MG/2ML Solution       acyclovir (ZOVIRAX) 400 MG tablet Take 1 Tablet by mouth 2 times a day. 20 Tablet 3    pantoprazole (PROTONIX) 40 MG Tablet Delayed Response Take 1 Tablet by mouth every day.      cetirizine (ZYRTEC) 10 MG Tab Take 10 mg by mouth every day.      terbinafine (LAMISIL) 250 MG Tab Take 1 Tablet by mouth every day. 30 Tablet 0    LINZESS 72 MCG Cap Take 1 Capsule by mouth every day. (Patient not taking: Reported on 12/16/2024) 30 Capsule 0    sucralfate (CARAFATE) 1 GM Tab Take 1 Tablet by mouth 4 Times a Day,Before Meals and at Bedtime. 120 Tablet 3     No current facility-administered medications for this visit.       Allergies   Allergen Reactions    Cephalosporins Swelling     Took and went into the sun, broke out in rash on face         Review of Systems:  Review of Systems   Constitutional:  Negative for chills and fever.   HENT:  Negative for congestion and sore throat.    Respiratory:  Negative for cough and shortness of breath.    Cardiovascular:  Negative for chest pain and palpitations.   Gastrointestinal:  Positive for abdominal pain and constipation. Negative for nausea and vomiting.   Genitourinary:  Negative for dysuria and hematuria.   Musculoskeletal:   "Negative for back pain and myalgias.   Skin:  Negative for itching and rash.   Neurological:  Negative for dizziness and headaches.        Medications:     Current Outpatient Medications:     Famotidine (PF), , Taking    acyclovir, 400 mg, Oral, BID, Taking    pantoprazole, 1 Tablet, Oral, DAILY, Taking    cetirizine, 10 mg, Oral, DAILY, Taking    terbinafine, 250 mg, Oral, DAILY    Linzess, 1 Capsule, Oral, DAILY (Patient not taking: Reported on 12/16/2024), Not Taking    sucralfate, 1 g, Oral, 4X/DAY ACHS     Objective:  Vitals:   /68 (BP Location: Left arm, Patient Position: Sitting, BP Cuff Size: Adult)   Pulse 70   Temp 37.1 °C (98.7 °F) (Temporal)   Ht 1.778 m (5' 10\")   Wt 82.8 kg (182 lb 9.6 oz)   SpO2 98%  Body mass index is 26.2 kg/m².    Physical Exam  Constitutional:       General: He is not in acute distress.     Appearance: Normal appearance.   HENT:      Head: Normocephalic and atraumatic.   Eyes:      Extraocular Movements: Extraocular movements intact.      Pupils: Pupils are equal, round, and reactive to light.   Cardiovascular:      Rate and Rhythm: Normal rate and regular rhythm.   Pulmonary:      Effort: No respiratory distress.      Breath sounds: Normal breath sounds.   Abdominal:      General: There is no distension.      Palpations: Abdomen is soft.   Neurological:      General: No focal deficit present.      Mental Status: He is oriented to person, place, and time.          Results:    Lab Results   Component Value Date/Time    CHOLSTRLTOT 220 (H) 10/02/2024 10:18 AM     (H) 10/02/2024 10:18 AM    HDL 43 10/02/2024 10:18 AM    TRIGLYCERIDE 73 10/02/2024 10:18 AM       Lab Results   Component Value Date/Time    SODIUM 139 11/05/2024 07:24 AM    POTASSIUM 4.4 11/05/2024 07:24 AM    CHLORIDE 101 11/05/2024 07:24 AM    CO2 27 11/05/2024 07:24 AM    GLUCOSE 95 11/05/2024 07:24 AM    BUN 18 11/05/2024 07:24 AM    CREATININE 0.77 11/05/2024 07:24 AM     Lab Results   Component " "Value Date/Time    ALKPHOSPHAT 54 11/05/2024 07:24 AM    ASTSGOT 23 11/05/2024 07:24 AM    ALTSGPT 15 11/05/2024 07:24 AM    TBILIRUBIN 0.8 11/05/2024 07:24 AM        Lab Results   Component Value Date/Time    WBC 5.2 06/13/2024 06:34 AM    RBC 4.32 (L) 06/13/2024 06:34 AM    HEMOGLOBIN 14.7 06/13/2024 06:34 AM    HEMATOCRIT 41.3 (L) 06/13/2024 06:34 AM    MCV 95.6 06/13/2024 06:34 AM    MCH 34.0 (H) 06/13/2024 06:34 AM    MCHC 35.6 06/13/2024 06:34 AM    MPV 9.8 06/13/2024 06:34 AM    NEUTSPOLYS 46.00 06/13/2024 06:34 AM    LYMPHOCYTES 41.80 (H) 06/13/2024 06:34 AM    MONOCYTES 8.30 06/13/2024 06:34 AM    EOSINOPHILS 3.10 06/13/2024 06:34 AM    BASOPHILS 0.60 06/13/2024 06:34 AM        Assessment and Plan:    #Abdominal pain  #Constipation  Has constellation of symptoms including left-sided abdominal pain and constipation.  He has what he believes to be stringy stool and possibly parasites.  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  However, he states that on 1 instance the lab had mishandled the specimen  -Advised patient to continue follow-up with [facility] for further recommendations and evaluation.  Additional referral for local GI placed as patient was having difficulties with GI consultants  - Will check peripheral smear and stool ova and parasites as patient is concerned that he has potential parasite in his body  - Referral for infectious disease for further " evaluation    Follow Up:  Return in about 6 months (around 6/16/2025).

## 2025-02-26 ENCOUNTER — OFFICE VISIT (OUTPATIENT)
Dept: MEDICAL GROUP | Facility: LAB | Age: 50
End: 2025-02-26
Payer: COMMERCIAL

## 2025-02-26 VITALS
TEMPERATURE: 96.9 F | RESPIRATION RATE: 14 BRPM | DIASTOLIC BLOOD PRESSURE: 76 MMHG | OXYGEN SATURATION: 99 % | SYSTOLIC BLOOD PRESSURE: 98 MMHG | HEART RATE: 64 BPM | HEIGHT: 70 IN | WEIGHT: 171 LBS | BODY MASS INDEX: 24.48 KG/M2

## 2025-02-26 DIAGNOSIS — H65.02 NON-RECURRENT ACUTE SEROUS OTITIS MEDIA OF LEFT EAR: ICD-10-CM

## 2025-02-26 DIAGNOSIS — R42 DIZZINESS: ICD-10-CM

## 2025-02-26 DIAGNOSIS — K59.00 CONSTIPATION, UNSPECIFIED CONSTIPATION TYPE: ICD-10-CM

## 2025-02-26 DIAGNOSIS — J06.9 ACUTE URI: ICD-10-CM

## 2025-02-26 DIAGNOSIS — R11.0 NAUSEA: ICD-10-CM

## 2025-02-26 RX ORDER — PREDNISONE 20 MG/1
40 TABLET ORAL DAILY
Qty: 10 TABLET | Refills: 0 | Status: SHIPPED | OUTPATIENT
Start: 2025-02-26

## 2025-02-26 RX ORDER — DOXYCYCLINE HYCLATE 100 MG
100 TABLET ORAL 2 TIMES DAILY
Qty: 12 TABLET | Refills: 0 | Status: SHIPPED | OUTPATIENT
Start: 2025-02-26 | End: 2025-03-04

## 2025-02-26 ASSESSMENT — PATIENT HEALTH QUESTIONNAIRE - PHQ9: CLINICAL INTERPRETATION OF PHQ2 SCORE: 0

## 2025-02-26 ASSESSMENT — FIBROSIS 4 INDEX: FIB4 SCORE: 1.18

## 2025-02-26 NOTE — PROGRESS NOTES
Verbal consent was acquired by the patient to use [employer] ambient listening note generation during this visit Yes      Subjective   Fermín Ortiz is a 49 y.o. male who presents for a few issues.   History of Present Illness  The patient is a 49-year-old male who presents for evaluation of left ear infection, neck soreness pain, and uri symptoms    He began experiencing symptoms on 2/10/2025 initially attributing them to influenza B, which his son had recently contracted. However, he also considered potential exposure to COVID-19 during a family gathering in [place]. A home test for COVID-19, influenza A, and influenza B returned negative results. He was prescribed Tamiflu via telehealth consultation, which he completed. His symptoms included chest congestion, nasal congestion, and mucus production, which have since subsided. Approximately 5 days ago, he developed left ear pain characterized by severe pressure and pain lasting about 12 hours. Self-treatment with carbamide peroxide resulted in some relief, but he continues to experience hearing decrease, tinnitus, and dizziness. He reports no photophobia but has difficulty localizing sounds due to his hearing impairment.    Last night, he experienced upper back pain and pressure on the left side of his head, which radiated to his shoulders. He took ibuprofen and doxycycline, which alleviated his back pain. He has a history of a shoulder injury but reports that the current pain is different. He has been experiencing body aches, primarily on the left side, extending from his groin to his spine and over his shoulder. He does not believe he has had a fever, although he has not been monitoring his temperature. He has experienced night sweats and felt nauseous and dizzy this morning. He attempted to schedule a telehealth appointment but was advised to seek in-person medical attention due to his dizziness. His symptoms have since improved.    He has been having some  "chronic constipation issues and needs to have a colonoscopy done.    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    MEDICATIONS  Current: Tamiflu, ibuprofen, doxycycline, Zyrtec, Flonase    Review of Systems  Negative except for HPI  Objective   BP 98/76 (BP Location: Right arm, Patient Position: Sitting, BP Cuff Size: Large adult long)   Pulse 64   Temp 36.1 °C (96.9 °F) (Temporal)   Resp 14   Ht 1.778 m (5' 10\")   Wt 77.6 kg (171 lb)   SpO2 99%   Physical Exam  ENT: Oropharynx is not erythematous.  He does have left-sided maxillary sinus tenderness.  He has enlarged and tender bilateral anterior and left posterior cervical lymph nodes.  Full range of motion of his neck.  The right eardrum is translucent and not erythematous.  The right ear canal is clear. The left tympanic membrane is erythematous, bulging, and there is a middle ear effusion as evidenced by multiple bubbles being present.  Respiratory: Nonlabored.  Clear to auscultation.  Able to speak in full comfortable sentences.  General: Calm, well-developed, well-nourished, interactive, pleasant, alert and oriented.  Neuro: Negative Kernig and Brudzinski signs  Results  Laboratory Studies  Home test for Covid, B and A, influenza was negative.       Assessment & Plan  \"  1. Non-recurrent acute serous otitis media of left ear        2. Acute URI        3. Nausea        4. Dizziness        \"  I sent a prescription to his pharmacy for 6 more days of doxycycline to complete a course of 100 mg twice daily for 7 full days and he understands importance of completing the prescription, previous doxycycline was leftover from malaria prophylaxis and he is taking 2 pills in the last 24 hours.  He will also start 40 mg prednisone each morning with food for the next several days, utilize Flonase 2 sprays each nostril that he has at home and generic " Zyrtec 10 mg daily.  Encouraged high water intake, vitamin C, rest, refraining from driving when dizzy.  Encouraged him to change positions slowly.  Encouraged him to enjoy quiet areas as his left ear may cause him discomfort with loud voices.  I encouraged him to go the emergency room if he has the worst headache of his life, fever greater than 102.5 or onset photophobia and phonophobia with nausea and vomiting.  In regards to posterior neck, upper back and shoulder pain, this is improving.  Prednisone should further reduce inflammation.  Discussed heat versus ice and stretching.     In regards to constipation, he has an active referral for GI and plans on scheduling a colonoscopy.        Please note that this dictation was created using voice recognition software. I have made every reasonable attempt to correct obvious errors, but I expect that there are errors of grammar and possibly content that I did not discover before finalizing the note.

## 2025-04-02 ENCOUNTER — APPOINTMENT (OUTPATIENT)
Dept: RADIOLOGY | Facility: MEDICAL CENTER | Age: 50
End: 2025-04-02
Attending: INTERNAL MEDICINE
Payer: COMMERCIAL

## 2025-04-21 ENCOUNTER — APPOINTMENT (OUTPATIENT)
Dept: RADIOLOGY | Facility: MEDICAL CENTER | Age: 50
End: 2025-04-21
Attending: INTERNAL MEDICINE
Payer: COMMERCIAL

## 2025-05-06 DIAGNOSIS — R89.9 ABNORMAL LABORATORY TEST RESULT: ICD-10-CM

## 2025-05-09 ENCOUNTER — TELEPHONE (OUTPATIENT)
Dept: INTERNAL MEDICINE | Facility: OTHER | Age: 50
End: 2025-05-09
Payer: COMMERCIAL

## 2025-05-09 NOTE — TELEPHONE ENCOUNTER
Patient contacted  the office, he's requesting a new referral for infectious disease. Patient's previous referrals were denied due to dx codes not being accepted.   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 Patient was dx with GI and states he has a lot of evidence to prove that he has this diagnosis.    I informed patient I would pass his message along to Dr. Barton. I informed him of 48-72 hour rule.

## 2025-05-11 DIAGNOSIS — I89.0 LYMPHANGIECTASIA: ICD-10-CM

## 2025-05-19 NOTE — Clinical Note
REFERRAL APPROVAL NOTICE         Sent on May 19, 2025                   Fermínralph Ortiz  [address]  [place] [address]                   Dear Mr. Ortiz,    After a careful review of the medical information and benefit coverage, [facility] has processed your referral. See below for additional details.    If applicable, you must be actively enrolled with your insurance for coverage of the authorized service. If you have any questions regarding your coverage, please contact your insurance directly.    REFERRAL INFORMATION   Referral #:  [id number]  Referred-To Provider    Referred-By Provider:  Infectious Disease    Adria Barton D.O.   [facility]      [address]  [place] [address]  [phone] [address] [place] [address]  [phone]    Referral Start Date:  05/11/2025  Referral End Date:   05/11/2026             SCHEDULING  If you do not already have an appointment, please call [phone] to make an appointment.     MORE INFORMATION  If you do not already have a [employer] account, sign up at: [email or web address]  You can access your medical information, make appointments, see lab results, billing information, and more.  If you have questions regarding this referral, please contact  the [facility] Referrals department at:             [phone]. Monday - Friday 8:00AM - 5:00PM.     Sincerely,    [facility]

## 2025-05-24 ENCOUNTER — HOSPITAL ENCOUNTER (OUTPATIENT)
Dept: RADIOLOGY | Facility: MEDICAL CENTER | Age: 50
End: 2025-05-24
Attending: INTERNAL MEDICINE
Payer: COMMERCIAL

## 2025-05-24 DIAGNOSIS — R10.9 ABDOMINAL PAIN IN MALE: ICD-10-CM

## 2025-05-24 DIAGNOSIS — K59.00 CONSTIPATION IN MALE: ICD-10-CM

## 2025-05-24 PROCEDURE — 700117 HCHG RX CONTRAST REV CODE 255: Performed by: INTERNAL MEDICINE

## 2025-05-24 PROCEDURE — 74177 CT ABD & PELVIS W/CONTRAST: CPT

## 2025-05-24 RX ADMIN — IOHEXOL 90 ML: 350 INJECTION, SOLUTION INTRAVENOUS at 19:30

## 2025-06-09 ENCOUNTER — APPOINTMENT (OUTPATIENT)
Dept: INTERNAL MEDICINE | Facility: OTHER | Age: 50
End: 2025-06-09
Payer: COMMERCIAL

## 2025-07-31 ENCOUNTER — HOSPITAL ENCOUNTER (OUTPATIENT)
Dept: LAB | Facility: MEDICAL CENTER | Age: 50
End: 2025-07-31
Attending: PHYSICIAN ASSISTANT
Payer: COMMERCIAL

## 2025-07-31 PROCEDURE — 86778 TOXOPLASMA ANTIBODY IGM: CPT

## 2025-07-31 PROCEDURE — 86777 TOXOPLASMA ANTIBODY: CPT

## 2025-08-02 LAB
T GONDII IGG SER-ACNC: <3 IU/ML
T GONDII IGM SER-ACNC: 7 AU/ML